# Patient Record
Sex: FEMALE | Employment: UNEMPLOYED | URBAN - METROPOLITAN AREA
[De-identification: names, ages, dates, MRNs, and addresses within clinical notes are randomized per-mention and may not be internally consistent; named-entity substitution may affect disease eponyms.]

---

## 2018-07-20 ENCOUNTER — TELEPHONE (OUTPATIENT)
Dept: FAMILY MEDICINE CLINIC | Facility: CLINIC | Age: 33
End: 2018-07-20

## 2018-07-20 NOTE — TELEPHONE ENCOUNTER
Spoke with mother, pt presently sleeping, pt woke early this morning and vomited x1. Pt is urinating. No loose stools. No temperature taken though pt \"looked flush\". Advised to evaluate upon pt waking up.  They do have a thermometer and mother will take

## 2018-07-20 NOTE — TELEPHONE ENCOUNTER
Patients mom called - pts is wheelchair bound - has been vomiting on/off for a week. Thought she was getting better and it started again. Was given pepto and she has not kept that down.   She had called earlier and was told we had no appts available - may

## 2018-11-26 ENCOUNTER — LAB ENCOUNTER (OUTPATIENT)
Dept: LAB | Age: 33
End: 2018-11-26
Attending: FAMILY MEDICINE
Payer: COMMERCIAL

## 2018-11-26 ENCOUNTER — OFFICE VISIT (OUTPATIENT)
Dept: FAMILY MEDICINE CLINIC | Facility: CLINIC | Age: 33
End: 2018-11-26
Payer: COMMERCIAL

## 2018-11-26 VITALS
SYSTOLIC BLOOD PRESSURE: 114 MMHG | DIASTOLIC BLOOD PRESSURE: 82 MMHG | TEMPERATURE: 98 F | WEIGHT: 146 LBS | BODY MASS INDEX: 28 KG/M2 | HEART RATE: 88 BPM | OXYGEN SATURATION: 98 % | RESPIRATION RATE: 16 BRPM

## 2018-11-26 DIAGNOSIS — Z00.00 LABORATORY EXAM ORDERED AS PART OF ROUTINE GENERAL MEDICAL EXAMINATION: Primary | ICD-10-CM

## 2018-11-26 DIAGNOSIS — E55.9 HYPOVITAMINOSIS D: ICD-10-CM

## 2018-11-26 DIAGNOSIS — Z00.00 LABORATORY EXAM ORDERED AS PART OF ROUTINE GENERAL MEDICAL EXAMINATION: ICD-10-CM

## 2018-11-26 PROCEDURE — 36415 COLL VENOUS BLD VENIPUNCTURE: CPT | Performed by: FAMILY MEDICINE

## 2018-11-26 PROCEDURE — 82306 VITAMIN D 25 HYDROXY: CPT | Performed by: FAMILY MEDICINE

## 2018-11-26 PROCEDURE — 90471 IMMUNIZATION ADMIN: CPT | Performed by: FAMILY MEDICINE

## 2018-11-26 PROCEDURE — 80050 GENERAL HEALTH PANEL: CPT | Performed by: FAMILY MEDICINE

## 2018-11-26 PROCEDURE — 90670 PCV13 VACCINE IM: CPT | Performed by: FAMILY MEDICINE

## 2018-11-26 PROCEDURE — 99213 OFFICE O/P EST LOW 20 MIN: CPT | Performed by: FAMILY MEDICINE

## 2018-11-26 PROCEDURE — 80061 LIPID PANEL: CPT | Performed by: FAMILY MEDICINE

## 2018-11-26 RX ORDER — LORATADINE 10 MG/1
10 TABLET ORAL DAILY
COMMUNITY

## 2018-11-26 NOTE — PROGRESS NOTES
Here for a variety of issues most notably those of an apparent dermatographia some. If her skin especially on the posterior trunk is lightly stroked it can cause itchy nuisance type of response with an elevated whelped appearance she has none currently.

## 2019-09-05 ENCOUNTER — OFFICE VISIT (OUTPATIENT)
Dept: FAMILY MEDICINE CLINIC | Facility: CLINIC | Age: 34
End: 2019-09-05
Payer: COMMERCIAL

## 2019-09-05 VITALS
TEMPERATURE: 98 F | RESPIRATION RATE: 18 BRPM | DIASTOLIC BLOOD PRESSURE: 82 MMHG | BODY MASS INDEX: 28 KG/M2 | OXYGEN SATURATION: 96 % | HEIGHT: 61 IN | HEART RATE: 105 BPM | SYSTOLIC BLOOD PRESSURE: 120 MMHG

## 2019-09-05 DIAGNOSIS — R35.0 FREQUENT URINATION: Primary | ICD-10-CM

## 2019-09-05 DIAGNOSIS — H61.22 IMPACTED CERUMEN, LEFT EAR: ICD-10-CM

## 2019-09-05 LAB
APPEARANCE: CLEAR
BILIRUBIN: NEGATIVE
GLUCOSE (URINE DIPSTICK): NEGATIVE MG/DL
KETONES (URINE DIPSTICK): NEGATIVE MG/DL
LEUKOCYTES: NEGATIVE
MULTISTIX LOT#: NORMAL NUMERIC
NITRITE, URINE: NEGATIVE
OCCULT BLOOD: NEGATIVE
PH, URINE: 7 (ref 4.5–8)
PROTEIN (URINE DIPSTICK): NEGATIVE MG/DL
SPECIFIC GRAVITY: 1.02 (ref 1–1.03)
URINE-COLOR: YELLOW

## 2019-09-05 PROCEDURE — 99213 OFFICE O/P EST LOW 20 MIN: CPT | Performed by: PHYSICIAN ASSISTANT

## 2019-09-05 PROCEDURE — 87086 URINE CULTURE/COLONY COUNT: CPT | Performed by: PHYSICIAN ASSISTANT

## 2019-09-05 PROCEDURE — 81003 URINALYSIS AUTO W/O SCOPE: CPT | Performed by: PHYSICIAN ASSISTANT

## 2019-09-05 NOTE — PROGRESS NOTES
HPI:    Patient ID: Marcel Jo is a 36year old female. HPI   Patient presents today c/o increased urinary frequency for the last week. She is urinating as much as every 5 minutes and sometimes unable to make it to the restroom on time.  She denies b wheezes. She has no rales. Abdominal: Soft. Bowel sounds are normal. She exhibits no distension and no mass. There is no hepatosplenomegaly. There is no tenderness. There is no rigidity, no rebound, no guarding and no CVA tenderness. No hernia.    Neurolo

## 2021-06-28 ENCOUNTER — OFFICE VISIT (OUTPATIENT)
Dept: FAMILY MEDICINE CLINIC | Facility: CLINIC | Age: 36
End: 2021-06-28
Payer: COMMERCIAL

## 2021-06-28 VITALS
HEART RATE: 110 BPM | SYSTOLIC BLOOD PRESSURE: 124 MMHG | RESPIRATION RATE: 18 BRPM | OXYGEN SATURATION: 96 % | DIASTOLIC BLOOD PRESSURE: 86 MMHG

## 2021-06-28 DIAGNOSIS — Z13.29 THYROID DISORDER SCREEN: ICD-10-CM

## 2021-06-28 DIAGNOSIS — Z13.0 SCREENING FOR DEFICIENCY ANEMIA: ICD-10-CM

## 2021-06-28 DIAGNOSIS — Z00.00 WELLNESS EXAMINATION: Primary | ICD-10-CM

## 2021-06-28 DIAGNOSIS — Z13.220 LIPID SCREENING: ICD-10-CM

## 2021-06-28 PROCEDURE — 3074F SYST BP LT 130 MM HG: CPT | Performed by: FAMILY MEDICINE

## 2021-06-28 PROCEDURE — 99395 PREV VISIT EST AGE 18-39: CPT | Performed by: FAMILY MEDICINE

## 2021-06-28 PROCEDURE — 3079F DIAST BP 80-89 MM HG: CPT | Performed by: FAMILY MEDICINE

## 2021-06-28 NOTE — PROGRESS NOTES
HPI:     Sal Friday is a 29year old female who presents for an Annual Health Visit. Has been following with Dr. Roro Gifford for around 20 years. Primarily here for meet and greet. Currently has been on a ketogenic diet.  Isn't weighted often as sh History Narrative      Not on file       REVIEW OF SYSTEMS:     Constitutional: negative  Eyes: negative  ENT: negative  Respiratory: negative  Cardiovascular: negative  Gastrointestinal: negative  Integument/Breast: see HPI  One of her heels had some roug PLAN:   Diagnoses and all orders for this visit:    Wellness examination  -     COMP METABOLIC PANEL (14); Future  -     LIPID PANEL; Future  -     TSH W REFLEX TO FREE T4; Future  -     CBC WITH DIFFERENTIAL WITH PLATELET;  Future    Lipid screening  -

## 2021-08-12 ENCOUNTER — LABORATORY ENCOUNTER (OUTPATIENT)
Dept: LAB | Age: 36
End: 2021-08-12
Attending: FAMILY MEDICINE
Payer: COMMERCIAL

## 2021-08-12 DIAGNOSIS — Z13.0 SCREENING FOR DEFICIENCY ANEMIA: ICD-10-CM

## 2021-08-12 DIAGNOSIS — Z13.29 THYROID DISORDER SCREEN: ICD-10-CM

## 2021-08-12 DIAGNOSIS — Z13.220 LIPID SCREENING: ICD-10-CM

## 2021-08-12 DIAGNOSIS — Z00.00 WELLNESS EXAMINATION: ICD-10-CM

## 2021-08-12 LAB
ALBUMIN SERPL-MCNC: 3.9 G/DL (ref 3.4–5)
ALBUMIN/GLOB SERPL: 1.1 {RATIO} (ref 1–2)
ALP LIVER SERPL-CCNC: 59 U/L
ALT SERPL-CCNC: 27 U/L
ANION GAP SERPL CALC-SCNC: 6 MMOL/L (ref 0–18)
AST SERPL-CCNC: 10 U/L (ref 15–37)
BASOPHILS # BLD AUTO: 0.07 X10(3) UL (ref 0–0.2)
BASOPHILS NFR BLD AUTO: 0.9 %
BILIRUB SERPL-MCNC: 0.3 MG/DL (ref 0.1–2)
BUN BLD-MCNC: 14 MG/DL (ref 7–18)
CALCIUM BLD-MCNC: 8.6 MG/DL (ref 8.5–10.1)
CHLORIDE SERPL-SCNC: 108 MMOL/L (ref 98–112)
CHOLEST SMN-MCNC: 247 MG/DL (ref ?–200)
CO2 SERPL-SCNC: 23 MMOL/L (ref 21–32)
CREAT BLD-MCNC: 0.52 MG/DL
EOSINOPHIL # BLD AUTO: 0.78 X10(3) UL (ref 0–0.7)
EOSINOPHIL NFR BLD AUTO: 9.5 %
ERYTHROCYTE [DISTWIDTH] IN BLOOD BY AUTOMATED COUNT: 12.2 %
GLOBULIN PLAS-MCNC: 3.5 G/DL (ref 2.8–4.4)
GLUCOSE BLD-MCNC: 89 MG/DL (ref 70–99)
HCT VFR BLD AUTO: 37 %
HDLC SERPL-MCNC: 62 MG/DL (ref 40–59)
HGB BLD-MCNC: 11.7 G/DL
IMM GRANULOCYTES # BLD AUTO: 0.01 X10(3) UL (ref 0–1)
IMM GRANULOCYTES NFR BLD: 0.1 %
LDLC SERPL CALC-MCNC: 176 MG/DL (ref ?–100)
LYMPHOCYTES # BLD AUTO: 2.43 X10(3) UL (ref 1–4)
LYMPHOCYTES NFR BLD AUTO: 29.6 %
M PROTEIN MFR SERPL ELPH: 7.4 G/DL (ref 6.4–8.2)
MCH RBC QN AUTO: 29.3 PG (ref 26–34)
MCHC RBC AUTO-ENTMCNC: 31.6 G/DL (ref 31–37)
MCV RBC AUTO: 92.5 FL
MONOCYTES # BLD AUTO: 0.73 X10(3) UL (ref 0.1–1)
MONOCYTES NFR BLD AUTO: 8.9 %
NEUTROPHILS # BLD AUTO: 4.19 X10 (3) UL (ref 1.5–7.7)
NEUTROPHILS # BLD AUTO: 4.19 X10(3) UL (ref 1.5–7.7)
NEUTROPHILS NFR BLD AUTO: 51 %
NONHDLC SERPL-MCNC: 185 MG/DL (ref ?–130)
OSMOLALITY SERPL CALC.SUM OF ELEC: 284 MOSM/KG (ref 275–295)
PATIENT FASTING Y/N/NP: YES
PATIENT FASTING Y/N/NP: YES
PLATELET # BLD AUTO: 348 10(3)UL (ref 150–450)
POTASSIUM SERPL-SCNC: 4 MMOL/L (ref 3.5–5.1)
RBC # BLD AUTO: 4 X10(6)UL
SODIUM SERPL-SCNC: 137 MMOL/L (ref 136–145)
TRIGL SERPL-MCNC: 55 MG/DL (ref 30–149)
TSI SER-ACNC: 2.16 MIU/ML (ref 0.36–3.74)
VLDLC SERPL CALC-MCNC: 11 MG/DL (ref 0–30)
WBC # BLD AUTO: 8.2 X10(3) UL (ref 4–11)

## 2021-08-12 PROCEDURE — 80061 LIPID PANEL: CPT | Performed by: FAMILY MEDICINE

## 2021-08-12 PROCEDURE — 80050 GENERAL HEALTH PANEL: CPT | Performed by: FAMILY MEDICINE

## 2022-09-16 ENCOUNTER — IMMUNIZATION (OUTPATIENT)
Dept: LAB | Age: 37
End: 2022-09-16
Attending: EMERGENCY MEDICINE
Payer: COMMERCIAL

## 2022-09-16 DIAGNOSIS — Z23 NEED FOR VACCINATION: Primary | ICD-10-CM

## 2022-09-16 PROCEDURE — 0134A SARSCOV2 VAC BVL 50MCG/0.5ML: CPT

## 2023-06-13 ENCOUNTER — TELEPHONE (OUTPATIENT)
Dept: FAMILY MEDICINE CLINIC | Facility: CLINIC | Age: 38
End: 2023-06-13

## 2023-06-13 NOTE — TELEPHONE ENCOUNTER
RECEIVED:  6/12/23    SENT TO SCAN STAT:  6/13/23    FAX TO :  [phone]    ALL RECORDS FROM 1/1/04 TO PRESENT      RESPOND BY 6/22/23

## 2023-12-24 ENCOUNTER — HOSPITAL ENCOUNTER (EMERGENCY)
Age: 38
Discharge: HOME | End: 2023-12-24
Attending: EMERGENCY MEDICINE
Payer: COMMERCIAL

## 2023-12-24 ENCOUNTER — APPOINTMENT (OUTPATIENT)
Dept: GENERAL RADIOLOGY | Age: 38
End: 2023-12-24
Attending: EMERGENCY MEDICINE
Payer: COMMERCIAL

## 2023-12-24 VITALS
RESPIRATION RATE: 20 BRPM | HEART RATE: 104 BPM | WEIGHT: 120 LBS | OXYGEN SATURATION: 98 % | DIASTOLIC BLOOD PRESSURE: 87 MMHG | BODY MASS INDEX: 22.66 KG/M2 | SYSTOLIC BLOOD PRESSURE: 126 MMHG | TEMPERATURE: 98 F | HEIGHT: 61 IN

## 2023-12-24 DIAGNOSIS — R00.0 SINUS TACHYCARDIA: ICD-10-CM

## 2023-12-24 DIAGNOSIS — R05.2 SUBACUTE COUGH: ICD-10-CM

## 2023-12-24 DIAGNOSIS — R00.2 PALPITATIONS: ICD-10-CM

## 2023-12-24 LAB
ALBUMIN SERPL-MCNC: 3.9 G/DL (ref 3.4–5)
ALBUMIN/GLOB SERPL: 1 {RATIO} (ref 1–2)
ALP LIVER SERPL-CCNC: 59 U/L
ALT SERPL-CCNC: 23 U/L
ANION GAP SERPL CALC-SCNC: 8 MMOL/L (ref 0–18)
AST SERPL-CCNC: 9 U/L (ref 15–37)
BASOPHILS # BLD AUTO: 0.07 X10(3) UL (ref 0–0.2)
BASOPHILS NFR BLD AUTO: 0.6 %
BILIRUB SERPL-MCNC: 0.3 MG/DL (ref 0.1–2)
BUN BLD-MCNC: 17 MG/DL (ref 9–23)
CALCIUM BLD-MCNC: 9.2 MG/DL (ref 8.5–10.1)
CHLORIDE SERPL-SCNC: 108 MMOL/L (ref 98–112)
CO2 SERPL-SCNC: 20 MMOL/L (ref 21–32)
CREAT BLD-MCNC: 0.53 MG/DL
D DIMER PPP FEU-MCNC: 0.46 UG/ML FEU (ref ?–0.5)
EGFRCR SERPLBLD CKD-EPI 2021: 122 ML/MIN/1.73M2 (ref 60–?)
EOSINOPHIL # BLD AUTO: 0.42 X10(3) UL (ref 0–0.7)
EOSINOPHIL NFR BLD AUTO: 3.5 %
ERYTHROCYTE [DISTWIDTH] IN BLOOD BY AUTOMATED COUNT: 12.8 %
GLOBULIN PLAS-MCNC: 4.1 G/DL (ref 2.8–4.4)
GLUCOSE BLD-MCNC: 105 MG/DL (ref 70–99)
HCT VFR BLD AUTO: 40.4 %
HGB BLD-MCNC: 13.3 G/DL
IMM GRANULOCYTES # BLD AUTO: 0.04 X10(3) UL (ref 0–1)
IMM GRANULOCYTES NFR BLD: 0.3 %
LYMPHOCYTES # BLD AUTO: 2.8 X10(3) UL (ref 1–4)
LYMPHOCYTES NFR BLD AUTO: 23.5 %
MCH RBC QN AUTO: 29.6 PG (ref 26–34)
MCHC RBC AUTO-ENTMCNC: 32.9 G/DL (ref 31–37)
MCV RBC AUTO: 90 FL
MONOCYTES # BLD AUTO: 1.06 X10(3) UL (ref 0.1–1)
MONOCYTES NFR BLD AUTO: 8.9 %
NEUTROPHILS # BLD AUTO: 7.51 X10 (3) UL (ref 1.5–7.7)
NEUTROPHILS # BLD AUTO: 7.51 X10(3) UL (ref 1.5–7.7)
NEUTROPHILS NFR BLD AUTO: 63.2 %
OSMOLALITY SERPL CALC.SUM OF ELEC: 284 MOSM/KG (ref 275–295)
PLATELET # BLD AUTO: 387 10(3)UL (ref 150–450)
POCT INFLUENZA A: NEGATIVE
POCT INFLUENZA B: NEGATIVE
POTASSIUM SERPL-SCNC: 3.7 MMOL/L (ref 3.5–5.1)
PROT SERPL-MCNC: 8 G/DL (ref 6.4–8.2)
RBC # BLD AUTO: 4.49 X10(6)UL
SARS-COV-2 RNA RESP QL NAA+PROBE: NOT DETECTED
SODIUM SERPL-SCNC: 136 MMOL/L (ref 136–145)
TROPONIN I SERPL HS-MCNC: 3 NG/L
WBC # BLD AUTO: 11.9 X10(3) UL (ref 4–11)

## 2023-12-24 PROCEDURE — 71045 X-RAY EXAM CHEST 1 VIEW: CPT | Performed by: EMERGENCY MEDICINE

## 2023-12-24 PROCEDURE — 99285 EMERGENCY DEPT VISIT HI MDM: CPT

## 2023-12-24 PROCEDURE — 87502 INFLUENZA DNA AMP PROBE: CPT | Performed by: EMERGENCY MEDICINE

## 2023-12-24 PROCEDURE — 85025 COMPLETE CBC W/AUTO DIFF WBC: CPT | Performed by: EMERGENCY MEDICINE

## 2023-12-24 PROCEDURE — 80053 COMPREHEN METABOLIC PANEL: CPT | Performed by: EMERGENCY MEDICINE

## 2023-12-24 PROCEDURE — 96360 HYDRATION IV INFUSION INIT: CPT

## 2023-12-24 PROCEDURE — 99284 EMERGENCY DEPT VISIT MOD MDM: CPT

## 2023-12-24 PROCEDURE — 85379 FIBRIN DEGRADATION QUANT: CPT | Performed by: EMERGENCY MEDICINE

## 2023-12-24 PROCEDURE — 84484 ASSAY OF TROPONIN QUANT: CPT | Performed by: EMERGENCY MEDICINE

## 2023-12-24 PROCEDURE — 93005 ELECTROCARDIOGRAM TRACING: CPT

## 2023-12-24 PROCEDURE — 93010 ELECTROCARDIOGRAM REPORT: CPT

## 2023-12-24 RX ORDER — BENZONATATE 100 MG/1
100 CAPSULE ORAL 3 TIMES DAILY PRN
Qty: 30 CAPSULE | Refills: 0 | Status: SHIPPED | OUTPATIENT
Start: 2023-12-24 | End: 2024-01-02

## 2023-12-26 LAB
ATRIAL RATE: 118 BPM
P AXIS: 40 DEGREES
P-R INTERVAL: 112 MS
Q-T INTERVAL: 310 MS
QRS DURATION: 68 MS
QTC CALCULATION (BEZET): 434 MS
R AXIS: 67 DEGREES
T AXIS: 68 DEGREES
VENTRICULAR RATE: 118 BPM

## 2024-01-02 ENCOUNTER — TELEPHONE (OUTPATIENT)
Dept: FAMILY MEDICINE CLINIC | Facility: CLINIC | Age: 39
End: 2024-01-02

## 2024-01-02 RX ORDER — BENZONATATE 100 MG/1
100 CAPSULE ORAL 3 TIMES DAILY PRN
Qty: 30 CAPSULE | Refills: 0 | Status: SHIPPED | OUTPATIENT
Start: 2024-01-02 | End: 2024-02-01

## 2024-01-02 NOTE — TELEPHONE ENCOUNTER
benzonatate 100 MG Oral Cap   [id number] - [place], [place] - [facility] [facility] [address], [phone], [phone]         Given in the ER on 12-    Please let patient know if we are able to refill.

## 2024-01-10 ENCOUNTER — OFFICE VISIT (OUTPATIENT)
Dept: FAMILY MEDICINE CLINIC | Facility: CLINIC | Age: 39
End: 2024-01-10
Payer: COMMERCIAL

## 2024-01-10 VITALS
BODY MASS INDEX: 22.66 KG/M2 | RESPIRATION RATE: 20 BRPM | HEIGHT: 61 IN | OXYGEN SATURATION: 98 % | HEART RATE: 102 BPM | WEIGHT: 120 LBS | SYSTOLIC BLOOD PRESSURE: 118 MMHG | DIASTOLIC BLOOD PRESSURE: 70 MMHG

## 2024-01-10 DIAGNOSIS — R05.2 SUBACUTE COUGH: ICD-10-CM

## 2024-01-10 DIAGNOSIS — R25.3 TWITCHING: Primary | ICD-10-CM

## 2024-01-10 PROCEDURE — 3078F DIAST BP <80 MM HG: CPT | Performed by: FAMILY MEDICINE

## 2024-01-10 PROCEDURE — 99213 OFFICE O/P EST LOW 20 MIN: CPT | Performed by: FAMILY MEDICINE

## 2024-01-10 PROCEDURE — 3008F BODY MASS INDEX DOCD: CPT | Performed by: FAMILY MEDICINE

## 2024-01-10 PROCEDURE — 3074F SYST BP LT 130 MM HG: CPT | Performed by: FAMILY MEDICINE

## 2024-01-10 NOTE — PROGRESS NOTES
[facility] Progress Note    SUBJECTIVE: Jaye Jones 38 year old female is here today for   Chief Complaint   Patient presents with    ER F/U     Room ER f/u        Jaye went to the ER on lila candy, heart rate was high.    Her dad is here and gives some history as well.    She was coughing more than normal and to the point of vomiting prior to that appointment. Don't believe is due to GI issue, as no issues with tolerating food.    After almost a week, ongoing, mom wanted to go get seen. Seemed like she was doing ok with antihistamines and OTC antitussives.    Also started to have twitching in her jaw, and her legs.    Has CP, and spasticity, so they were uncertain how substantial that was.    Looked up and saw that benadryl can cause a fasciculation syndrome, so stopped this and robitussin DM,     Went to bed, and tried tylenol 3, for cough. Then notes heart beating fast, and pulse ox has 137 bpm.    This led to them going into [facility].    Heart has felt fine since then, still have some twitching, feels like jaw will move up and down.    Is on a ketogenic diet.    Not having paroxysms of cough that lead to puking, but still present.        PMH  Past Medical History:   Diagnosis Date    Hordeolum internum     Impacted cerumen     Syncope and collapse         PSH  Past Surgical History:   Procedure Laterality Date    ANESTH,HIP JOINT SURGERY  1/1/90    bilateral metal plates    OTHER  1/1/90    femoral osteoplasty lengthening    OTHER SURGICAL HISTORY      lengthening of hamstring tendon multiple, both legs    OTHER SURGICAL HISTORY  1/1/88    rhizotomy        Social Hx:  No changes    ROS  See HPI    OBJECTIVE:  /70   Pulse 102   Resp 20   Ht 5' 1\" (1.549 m)   Wt 120 lb (54.4 kg)   SpO2 98%   BMI 22.67 kg/m²       CV: RRR, s1 and s2 present, no murmurs clicks or rubs  Resp: clear to auscultation bilaterally        Labs:          Meds:   Current Outpatient Medications    Medication Sig Dispense Refill    benzonatate 100 MG Oral Cap Take 1 capsule (100 mg total) by mouth 3 (three) times daily as needed for cough. 30 capsule 0    loratadine 10 MG Oral Tab Take 1 tablet (10 mg total) by mouth daily.           Assessment/Plan  Jaye was seen today for er f/u.    Diagnoses and all orders for this visit:    Twitching    Subacute cough         For now ok to watch and wait as symptoms have been improving. If return of palpitations can order cardiac monitor. If twitching worsens or not resolving consider recheck labs, and possibly neurology evaluation.         Total Time spent with patient and coordinating care:  20 minutes.    Follow up: as needed for well care.      Eddie Chang MD

## 2024-01-15 RX ORDER — BENZONATATE 100 MG/1
100 CAPSULE ORAL 3 TIMES DAILY PRN
Qty: 30 CAPSULE | Refills: 0 | Status: CANCELLED | OUTPATIENT
Start: 2024-01-15 | End: 2024-02-14

## 2024-01-15 RX ORDER — BENZONATATE 100 MG/1
100 CAPSULE ORAL 3 TIMES DAILY PRN
Qty: 30 CAPSULE | Refills: 0 | OUTPATIENT
Start: 2024-01-15 | End: 2024-02-14

## 2024-01-15 RX ORDER — BENZONATATE 100 MG/1
100 CAPSULE ORAL 3 TIMES DAILY PRN
Qty: 30 CAPSULE | Refills: 0 | Status: SHIPPED | OUTPATIENT
Start: 2024-01-15 | End: 2024-02-14

## 2024-01-15 NOTE — TELEPHONE ENCOUNTER
I don't know, typically when I deny something I'll type a reason in, I don't remember the order coming in and me canceling.  Was there a duplicate and somehow both got rejected? Filled.

## 2024-01-26 RX ORDER — BENZONATATE 100 MG/1
100 CAPSULE ORAL 3 TIMES DAILY PRN
Qty: 30 CAPSULE | Refills: 0 | Status: SHIPPED | OUTPATIENT
Start: 2024-01-26 | End: 2024-02-25

## 2024-01-26 NOTE — TELEPHONE ENCOUNTER
A refill request was received for:  Requested Prescriptions     Pending Prescriptions Disp Refills    benzonatate 100 MG Oral Cap 30 capsule 0     Sig: Take 1 capsule (100 mg total) by mouth 3 (three) times daily as needed for cough.       Last refill date:1/15/2024       Last office visit:1/10/2024    Follow up due:  Future Appointments   Date Time Provider Department Center   2/9/2024 12:10 PM Joselito Alfaro MD [facility]

## 2024-02-05 RX ORDER — BENZONATATE 100 MG/1
100 CAPSULE ORAL 3 TIMES DAILY PRN
Qty: 30 CAPSULE | Refills: 0 | Status: SHIPPED | OUTPATIENT
Start: 2024-02-05 | End: 2024-03-06

## 2024-02-05 NOTE — TELEPHONE ENCOUNTER
.A refill request was received for:  Requested Prescriptions     Pending Prescriptions Disp Refills    benzonatate 100 MG Oral Cap 30 capsule 0     Sig: Take 1 capsule (100 mg total) by mouth 3 (three) times daily as needed for cough.       Last refill date:   1/10/2024    Last office visit: 1/10/2024    Follow up due:  Future Appointments   Date Time Provider Department Center   2/9/2024 12:10 PM Joselito Alfaro MD [facility]

## 2024-02-09 ENCOUNTER — OFFICE VISIT (OUTPATIENT)
Dept: NEUROLOGY | Facility: CLINIC | Age: 39
End: 2024-02-09
Payer: COMMERCIAL

## 2024-02-09 VITALS
BODY MASS INDEX: 23 KG/M2 | DIASTOLIC BLOOD PRESSURE: 72 MMHG | WEIGHT: 120 LBS | HEART RATE: 115 BPM | SYSTOLIC BLOOD PRESSURE: 128 MMHG | RESPIRATION RATE: 16 BRPM

## 2024-02-09 DIAGNOSIS — M26.609 TMJ DYSFUNCTION: ICD-10-CM

## 2024-02-09 DIAGNOSIS — G51.39 FACIAL SPASM: ICD-10-CM

## 2024-02-09 DIAGNOSIS — G82.50 SPASTIC QUADRIPLEGIA (HCC): ICD-10-CM

## 2024-02-09 DIAGNOSIS — Z86.69 HISTORY OF CEREBRAL PALSY: ICD-10-CM

## 2024-02-09 PROCEDURE — 3078F DIAST BP <80 MM HG: CPT | Performed by: OTHER

## 2024-02-09 PROCEDURE — 3074F SYST BP LT 130 MM HG: CPT | Performed by: OTHER

## 2024-02-09 PROCEDURE — 99204 OFFICE O/P NEW MOD 45 MIN: CPT | Performed by: OTHER

## 2024-02-09 RX ORDER — POTASSIUM CHLORIDE 1.5 G/1.58G
20 POWDER, FOR SOLUTION ORAL 2 TIMES DAILY
COMMUNITY

## 2024-02-09 RX ORDER — BACLOFEN 5 MG/1
5 TABLET ORAL 2 TIMES DAILY
Qty: 60 TABLET | Refills: 2 | Status: SHIPPED | OUTPATIENT
Start: 2024-02-09

## 2024-02-09 NOTE — PATIENT INSTRUCTIONS
Take Baclofen 5 mg at night and take extra 5 mg in the morning  No benadryl         2. Left TMJ dysfunction for Physical therapy      3.  Follow with Dentist TMJ specialist            Refill policies:    Allow 2-3 business days for refills; controlled substances may take longer.  Contact your pharmacy at least 5 days prior to running out of medication and have them send an electronic request or submit request through the “request refill” option in your [employer] account.  Refills are not addressed on weekends; covering physicians do not authorize routine medications on weekends.  No narcotics or controlled substances are refilled after noon on Fridays or by on call physicians.  By law, narcotics must be electronically prescribed.  A 30 day supply with no refills is the maximum allowed.  If your prescription is due for a refill, you may be due for a follow up appointment.  To best provide you care, patients receiving routine medications need to be seen at least once a year.  Patients receiving narcotic/controlled substance medications need to be seen at least once every 3 months.  In the event that your preferred pharmacy does not have the requested medication in stock (e.g. Backordered), it is your responsibility to find another pharmacy that has the requested medication available.  We will gladly send a new prescription to that pharmacy at your request.    Scheduling Tests:    If your physician has ordered radiology tests such as MRI or CT scans, please contact Central Scheduling at [phone] right away to schedule the test.  Once scheduled, the [facility] Centralized Referral Team will work with your insurance carrier to obtain pre-certification or prior authorization.  Depending on your insurance carrier, approval may take 3-10 days.  It is highly recommended patients assure they have received an authorization before having a test performed.  If test is done without insurance authorization, patient may be responsible  for the entire amount billed.      Precertification and Prior Authorizations:  If your physician has recommended that you have a procedure or additional testing performed the [facility] Centralized Referral Team will contact your insurance carrier to obtain pre-certification or prior authorization.    You are strongly encouraged to contact your insurance carrier to verify that your procedure/test has been approved and is a COVERED benefit.  Although the [facility] Centralized Referral Team does its due diligence, the insurance carrier gives the disclaimer that \"Although the procedure is authorized, this does not guarantee payment.\"    Ultimately the patient is responsible for payment.   Thank you for your understanding in this matter.  Paperwork Completion:  If you require FMLA or disability paperwork for your recovery, please make sure to either drop it off or have it faxed to our office at [phone]. Be sure the form has your name and date of birth on it.  The form will be faxed to our Forms Department and they will complete it for you.  There is a 25$ fee for all forms that need to be filled out.  Please be aware there is a 10-14 day turnaround time.  You will need to sign a release of information (NISSA) form if your paperwork does not come with one.  You may call the Forms Department with any questions at [phone].  Their fax number is [phone].

## 2024-02-09 NOTE — PROGRESS NOTES
HPI:    Patient ID: Jaye Jones is a 39 year old female.  PCP: Dr Bernardo ORELLANA  Jaye Jones is a 39 year old female with history of cerebral palsy and spastic quadriplegia who presents for evaluation of jaw/facial twitching. She is here along with her father who reports that Jaye had URI around [place] and since then have chronic cough and during coughing jaw twitches and also she has noticed some right facial spasm for a few seconds. No facial or jaw pain  She has baseline facial asymmetry and drooling.      Father reports she was born full term by C section and shortly after birth aspirated and coded, placed on ventilator and had seizures. She was treated with seizure medication until 2 year of age and remains seizure free that. She was spastic quadriplegia right side more weak than left. She is able to speak and feeds herself. She follows with Physiatry at [facility].        HISTORY:  Past Medical History:   Diagnosis Date    Hordeolum internum     Impacted cerumen     Syncope and collapse       Past Surgical History:   Procedure Laterality Date    ANESTH,HIP JOINT SURGERY  1/1/90    bilateral metal plates    OTHER  1/1/90    femoral osteoplasty lengthening    OTHER SURGICAL HISTORY      lengthening of hamstring tendon multiple, both legs    OTHER SURGICAL HISTORY  1/1/88    rhizotomy      Family History   Problem Relation Age of Onset    Other (SIDS [Other]) Sister       Social History     Socioeconomic History    Marital status: Single   Tobacco Use    Smoking status: Never     Passive exposure: Never    Smokeless tobacco: Never   Vaping Use    Vaping Use: Never used   Substance and Sexual Activity    Alcohol use: No    Drug use: No   Other Topics Concern    Caffeine Concern Yes    Exercise Yes        Review of Systems   Constitutional: Negative.    HENT:  Positive for drooling.    Eyes: Negative.    Respiratory: Negative.     Cardiovascular: Negative.    Gastrointestinal: Negative.    Endocrine:  Negative.    Genitourinary: Negative.    Musculoskeletal:  Positive for gait problem. Negative for neck stiffness.   Skin: Negative.    Allergic/Immunologic: Negative.    Neurological:  Positive for facial asymmetry and speech difficulty.        Spastic quadriplegia   Hematological: Negative.    Psychiatric/Behavioral: Negative.     All other systems reviewed and are negative.           Current Outpatient Medications   Medication Sig Dispense Refill    potassium chloride 20 MEQ Oral Powd Pack Take 20 mEq by mouth 2 (two) times daily. Dad states a spoon full      benzonatate 100 MG Oral Cap Take 1 capsule (100 mg total) by mouth 3 (three) times daily as needed for cough. 30 capsule 0    loratadine 10 MG Oral Tab Take 1 tablet (10 mg total) by mouth daily.       Allergies:No Known Allergies  PHYSICAL EXAM:   Physical Exam  Blood pressure 128/72, pulse 115, resp. rate 16, weight 120 lb (54.4 kg).      General Appearance: Sitting in wheel chair, no apparent distress   HEENT: Normocephalic and atraumatic.   Neck: slight right torticollis  Cardiovascular: Normal rate, regular rhythm and normal heart sounds.    Pulmonary/Chest: Effort normal and breath sounds normal.   Abdominal: Soft. Bowel sounds are normal.   Psych: normal mood and affect    Neurological:  Patient is awake, alert and oriented to person, place and time . Speech is fluent but very dysarthric, able to follows simple commands      Cranial Nerves:   II: Visual acuity: normal  III: Pupils: equal, round, reactive to light  III,IV,VI: mild dysconjugate gaze  V: Facial sensation: intact  VII: Facial strength: facial asymmetry at baseline  VIII: Hearing: intact  IX: Palate: intact  XI: Shoulder shrug: intact  XII: Tongue movement: normal    Motor : Spastic quadriplegia right>left, contracted right hand     Sensory: Sensory examination is normal to light touch and pinprick     Coordination: impaired.    Gait: wheel chair bound        ASSESSMENT/PLAN:        ICD-10-CM    1. TMJ dysfunction  M26.609 Referral to Physical Therapy and Rehab      2. Facial spasm  G51.39       3. Spastic quadriplegia (HCC)  G82.50         Facial/jaw twitching spasm, likely related to cerebral palsy and TMJ dysfunction. Twitching triggered by cough, had recent URI.   CP causes poor facial muscle control and TMJ disorder.    Refer to Physical therapy for TMJ therapy  Start Baclofen 5 mg BID for spasticity  Patient follows with Physiatry    Thank you for allowing us to participate in your patient's care.        Joselito Alfaro MD  [facility]      Meds This Visit:  Requested Prescriptions      No prescriptions requested or ordered in this encounter       Imaging & Referrals:  None     ID#[id number]

## 2024-02-17 RX ORDER — BENZONATATE 100 MG/1
100 CAPSULE ORAL 3 TIMES DAILY PRN
Qty: 30 CAPSULE | Refills: 0 | Status: SHIPPED | OUTPATIENT
Start: 2024-02-17 | End: 2024-03-18

## 2024-02-17 NOTE — TELEPHONE ENCOUNTER
A refill request was received for:  Requested Prescriptions     Pending Prescriptions Disp Refills    benzonatate 100 MG Oral Cap 30 capsule 0     Sig: Take 1 capsule (100 mg total) by mouth 3 (three) times daily as needed for cough.       Last refill date:2/5/2024       Last office visit:1/10/2024     Follow up due:  Future Appointments   Date Time Provider Department Center   2/28/2024  9:15 AM Dona Borja, PT [facility]   3/11/2024  5:00 PM Dona Borja, PT [facility]   3/13/2024  5:00 PM Dona Borja, PT [facility]   3/18/2024  5:00 PM Dona Borja, PT [facility]   3/20/2024  5:00 PM Dona Borja, PT [facility]   3/25/2024  5:45 PM Dona Borja, PT [facility]   3/27/2024  5:00 PM Dona Borja, PT [facility]   5/10/2024  1:10 PM Joselito Alfaro MD [facility]

## 2024-02-26 ENCOUNTER — TELEPHONE (OUTPATIENT)
Dept: PHYSICAL THERAPY | Facility: HOSPITAL | Age: 39
End: 2024-02-26

## 2024-02-27 ENCOUNTER — TELEPHONE (OUTPATIENT)
Dept: NEUROLOGY | Facility: CLINIC | Age: 39
End: 2024-02-27

## 2024-02-27 DIAGNOSIS — G82.50 SPASTIC QUADRIPLEGIA (HCC): Primary | ICD-10-CM

## 2024-02-27 DIAGNOSIS — Z86.69 HISTORY OF CEREBRAL PALSY: ICD-10-CM

## 2024-02-27 NOTE — TELEPHONE ENCOUNTER
Pt states she is feeling tight. She is requesting her PT order be updated to include working on her arms. Please advise, Pt's best call back number is [phone]. Endorsed to RN for Provider.

## 2024-02-28 ENCOUNTER — TELEPHONE (OUTPATIENT)
Dept: PHYSICAL THERAPY | Facility: HOSPITAL | Age: 39
End: 2024-02-28

## 2024-02-28 ENCOUNTER — OFFICE VISIT (OUTPATIENT)
Dept: PHYSICAL THERAPY | Age: 39
End: 2024-02-28
Attending: Other
Payer: COMMERCIAL

## 2024-02-28 DIAGNOSIS — M26.609 TMJ DYSFUNCTION: Primary | ICD-10-CM

## 2024-02-28 PROCEDURE — 97110 THERAPEUTIC EXERCISES: CPT

## 2024-02-28 PROCEDURE — 97162 PT EVAL MOD COMPLEX 30 MIN: CPT

## 2024-02-28 NOTE — PROGRESS NOTES
TEMPORAL MANDIBULAR JOINT EVALUATION:   Referring Physician: Dr. Alfaro  Diagnosis: Spastic quadriplegia (HCC) (G82.50)  History of cerebral palsy (Z86.69)     Date of Service: 2/28/2024  Precautions: CP       PATIENT SUMMARY   Jaye Jones is a 39 year old y/o female who presents to therapy today with complaints of spasms in her jaw since November 2023. Spasms in LUE since last year.  She had bad bronchitis (severe coughing episodes). She continues to have spasms even as illness has resolved. She does not have these spasms at baseline. She was recommended to go to PT, dentistry, and physiatry. She has tightness more-so in her L arm. These spasms are an additional chief complaint. She is clear that she does not have any pain. She was given baclofen 5mg 2 weeks ago but does not notice any changes in symptoms yet. She had consult with dentist. She has appt with physiatry tomorrow. She has had PT in the past for UE/LE symptoms- she does not continue her HEP. She has been looking online for exercise options.     Jaw spasm: chief complaint 1: chin is moving up and down. She has what she describes as tightness anterior chin about 2-3 inch span. She first started noticing the spasms a few week after thanksgiving. Frequency: random times throughout the day- 20-30 times per day. Sometimes even more than this. Duration: 1-2 sec. They go away on their own. Aggravating activities include coughing- sometimes it happens when she doesn't cough too.   Relieving activities include does not have to do anything to make it go away. Pt describes spams: 24 hour pattern: more likely to happen first thing in the morning.  She does not get HA. Pt reports current condition is same over time. Other treatments included never for TMJ. Baseline: has popping and cracking in jaw bilaterally upon opening- she states this does not bother her and causes no pain. Sleeping: limited more by the spams in the L arm. She does wake up with spasms in  jaw sometimes.   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 When the weather is warmer she will go for walks in her power w/c or to the library.     Red Flag Qs: Pt currently denying the following:  Dizzy, no trouble swallowing, no HA.   Special Qs:  L handed. Pt currently denying pain or issues with the following: cough, sneeze, clicking, popping, locking, catching, giving way.     Upper extremity spasms: chief complaint: 2: on the L, triggers include typing, sleeping and sometimes when doing nothing. She states Baclofen is not helping yet. She states there are days when she feels loose and days when she feels tightness. Spasms are back into D2 extension position typically. They are happening many times per day >20. They last a few moments. She will try stretching her arm on the table but this does not always help relieve the spasm.     Physician Appointment:Physiatrist tomorrow.      Pt describes pain not chief complaint.   Current functional limitations include resting/sleeping without interruption, typing without interruption.   Equipment Currently Using: Other power wheelchair, no dental devices    Neck Disability Index Score  Score: 34 % (2/14/2024 12:31 AM)      Jaye describes prior level of function no spasms in jaw prior to 2023 November. Pt goals include to reduce spasms in LUE and jaw.  Past medical history was reviewed with Jaye. Significant findings include  has a past medical history of Hordeolum internum, Impacted cerumen, and Syncope and collapse.     ASSESSMENT  Jaye presents to physical therapy evaluation with primary c/o Jaw spasms and tightness, LUE spasms. The results of the objective tests and measures show positive S curve pattern upon opening, positive cotton roll test on the R indicating muscular involvement, tighntess in R SCM compared to L. AROM unremarkable on the L compared to the R. No spasm triggered with MMT of UEs grossly.  Functional deficits include but are not  limited to ability to rest.  Signs and symptoms are consistent with diagnosis of TMJ dysfunction with spasms, Postural abnormalities, LUE spasticity - worsening. Pt and PT discussed evaluation findings, pathology, POC and HEP.  Pt voiced understanding and performs HEP correctly without reported pain. Skilled Physical Therapy is medically necessary to address the above impairments and reach functional goals.     Precautions:   medical hx CP  OBJECTIVE:   Observation/Posture: slight R torticollis   Opening Pattern: \"s\" curve (motor control): +     \"C\" curve (disc issue): -  TMJ AROM:   Open (45 mm):45 mm- *popping bilaterally, *spasm triggered shortly after  Protrusion (6-8 mm): unable  Retrusion (3 mm): unable  Lateral (10-15 mm): R: 5 L:5    Cotton Roll Test: bite on molars to unload joint: if pain increases: muscular: R *spasm; L: unremarkable        If pain decreases: joint or retro discal: no reduction in symptoms    Shoulder AROM:   Flexion 160   Abduction 160;   ER: *triggers spasm in 90/90 ER     AROM:  Cervical Spine   Mobility End Range    Flexion 50                  Extension 50 *repeated motions triggers spasm   R Sidebend 25*requires TCs    L Sidebend 25 *requires TCs    R Rotation 70    L Rotation 70        TMJ Accessory Joint  Mobility  Right   Left    Anterior  Unremarkable*tenderness Unremarkable   Inferior  hypomobile hypomobile   Medial   hypomobile Hypomobile               MMT STRENGTH TESTING:      Left  Right Comments   GH Flexion     4/5   4/5      GH Extension  4/5   4/5      GH Abduction 4-/5   4-/5      GH ER 3+/5   3+/5      GH IR 4-/5   4-/5      *no LUE spasms triggered     Flexibility and Palpation:   Upper Trapezius: R tightness to palpation,  L tightness to palpation   Levator Scap: R tightness, L tightness  Scalenes/SCM: R  Tightness-  slight right torticollis , L WFL  Hyoids: R WFL, L WFL  Masseter: R Tenderness, tightness, L WFL  Temporalis: R WFL, L WFL  Lateral Pterygoid: R  tenderness, L WFL    Today’s Treatment and Response:   Pt education was provided on exam findings, treatment diagnosis, treatment plan, expectations, and prognosis. Pt was also provided recommendations for see below    Manual: 5 min   STM to R SCM and masseter 5 min   Patient was instructed in and issued a HEP for:   There ex: 10 min   Issued and Handouts Given:    open and close with tongue on roof   trace figure 8 on roof of mouth   self glides   postural exercises  Pt education:    Natural resting position- tongue on roof of mouth   Avoid hard chewy foods   Avoid resting chin on hand- pressure   - phone conversation with dentist recommending conservative trial first due to potential lack of tolerance to any oral devices    Access Code: [email or web address]  URL: [email or web address]  Date: 02/28/2024  Prepared by: Dona Borja    Program Notes  Start with good upright posture then proceed to exercises.     Exercises  - Correct Seated Posture  - 3 x daily - 7 x weekly - 1 reps  - Seated Cervical Retraction  - 3-4 x daily - 7 x weekly - 3 sets - 10 reps  - Seated Cervical Rotation AROM  - 1 x daily - 7 x weekly - 2 sets - 10 reps  - Seated TMJ Opening with Unilateral Facilitation with Finger  - 2 x daily - 7 x weekly - 1 sets - 10 reps    Charges: PT Eval Moderate Complexity, there ex: 1       Total Timed Treatment: 15 min min     Total Treatment Time: 50 min     Based on clinical rationale and outcome measures, this evaluation involved Moderate Complexity decision making due to 1-2 personal factors/comorbidities, 3 body structures involved/activity limitations, and evolving symptoms including changing pain levels.  PLAN OF CARE:    Goals: (to be met in 8 visits)  Pt will demonstrate ability to complete opening to 50cm with no subjective tightness in order to maximize eating and chewing capacity.   Pt will demonstrate ability to bite down on the R side without spasm or pain.   Pt will demonstrate  appropriate upper cervical posture without cues in order to maximize TMJ function and alignment.   Pt will be indep with HEP.     Frequency / Duration: Patient will be seen for 1-2 x/week or a total of 8 visits over a 90 day period. Treatment will include: Manual Therapy, Mechanical Traction, Neuromuscular Re-education, Self-Care Home Management, Therapeutic Activities, Therapeutic Exercise, Home Exercise Program instruction, and Modalities to include: Electrical stimulation (unattended) and Ultrasound    Education or treatment limitation: None  Rehab Potential:good    Patient/Family/Caregiver was advised of these findings, precautions, and treatment options and has agreed to actively participate in planning and for this course of care.    Thank you for your referral. Please co-sign or sign and return this letter via fax as soon as possible to [phone]. If you have any questions, please contact me at Dept: [phone]    Sincerely,  Electronically signed by therapist: Dona Borja, PT  Physician's certification required: Yes  I certify the need for these services furnished under this plan of treatment and while under my care.    X___________________________________________________ Date____________________    Certification From: 2/28/2024  To:5/28/2024

## 2024-02-29 ENCOUNTER — TELEPHONE (OUTPATIENT)
Dept: PHYSICAL THERAPY | Facility: HOSPITAL | Age: 39
End: 2024-02-29

## 2024-03-01 RX ORDER — BENZONATATE 100 MG/1
100 CAPSULE ORAL 3 TIMES DAILY PRN
Qty: 30 CAPSULE | Refills: 0 | Status: SHIPPED | OUTPATIENT
Start: 2024-03-01 | End: 2024-03-31

## 2024-03-01 NOTE — TELEPHONE ENCOUNTER
A refill request was received for:  Requested Prescriptions     Pending Prescriptions Disp Refills    benzonatate 100 MG Oral Cap 30 capsule 0     Sig: Take 1 capsule (100 mg total) by mouth 3 (three) times daily as needed for cough.       Last refill date: 2/17/24      Last office visit:1/10/24     Follow up due:  Future Appointments   Date Time Provider Department Center   3/11/2024  5:00 PM Dona Borja, PT SNPT [facility]   3/13/2024  5:00 PM Dona Borja, PT SNPT [facility]   3/18/2024  5:00 PM Dona Borja, PT SNPT [facility]   3/20/2024  5:00 PM Dona Borja, PT SNPT [facility]   3/25/2024  5:45 PM Dona Borja, PT SNPT [facility]   3/27/2024  5:00 PM Dona Borja, PT SNPT [facility]   5/10/2024  1:10 PM Joselito Alfaro MD [facility]

## 2024-03-06 ENCOUNTER — APPOINTMENT (OUTPATIENT)
Dept: PHYSICAL THERAPY | Age: 39
End: 2024-03-06
Attending: Other
Payer: COMMERCIAL

## 2024-03-11 ENCOUNTER — OFFICE VISIT (OUTPATIENT)
Dept: PHYSICAL THERAPY | Age: 39
End: 2024-03-11
Attending: Other
Payer: COMMERCIAL

## 2024-03-11 ENCOUNTER — ORDER TRANSCRIPTION (OUTPATIENT)
Dept: PHYSICAL THERAPY | Facility: HOSPITAL | Age: 39
End: 2024-03-11

## 2024-03-11 DIAGNOSIS — G80.9 CEREBRAL PALSY (HCC): Primary | ICD-10-CM

## 2024-03-11 PROCEDURE — 97140 MANUAL THERAPY 1/> REGIONS: CPT

## 2024-03-11 PROCEDURE — 97110 THERAPEUTIC EXERCISES: CPT

## 2024-03-11 RX ORDER — BENZONATATE 100 MG/1
100 CAPSULE ORAL 3 TIMES DAILY PRN
Qty: 30 CAPSULE | Refills: 0 | Status: SHIPPED | OUTPATIENT
Start: 2024-03-11 | End: 2024-04-10

## 2024-03-11 NOTE — PROGRESS NOTES
Diagnosis:   TMJ dysfunction (M26.609)   Spastic quadriplegia (HCC) (G82.50)  History of cerebral palsy (Z86.69)          Referring Provider: Angelina  Date of Evaluation:    2/28/2024     Precautions:   CP Next MD visit:   none scheduled  Date of Surgery: n/a   Insurance Primary/Secondary: BCBS POS / N/A     # Auth Visits: BCBS no limit            Subjective: Pt states starting OT for LUE. She states episodes are around eating and around bedtime. She is unsure if it is related to eating any particularly chewy meals/foods.   Pain: 0/10- not chief complaint  Chief complaint: TMJ spasms      Objective:   Cotton Roll Test: bite on molars to unload joint: if pain increases: muscular: R unremarkable 3/11/2024 ; L: unremarkable     Cervical AROM: SB: R: 40 deg; L; 15 deg*tightness R side neck   Palpation: no tenderness to palpation, tightness in R SCM and scalenes    Neck Disability Index Score  Score: 34 % (2/14/2024 12:31 AM)    Assessment: Pt has notable restrictions in R SB Passively as well as actively. During session manual intervention was utilized to reduce spasm of TMJ elevators. Tolerated session well. Only 1 episode of spasm noted t/o even with clenching, opening and various functional activities. Postural cues required t/o therapy session for reminder of mechanics/starting position. Pt verbalized understanding. HEP updated.       Goals:   Goals: (to be met in 8 visits)  Pt will demonstrate ability to complete opening to 50cm with no subjective tightness in order to maximize eating and chewing capacity.   Pt will demonstrate ability to bite down on the R side without spasm or pain.   Pt will demonstrate appropriate upper cervical posture without cues in order to maximize TMJ function and alignment.   Pt will be indep with HEP.     Plan: Continue per plan of care. Plan for next therapy session: consider bite exercises for HEP if frequency of spasm unchanged, continue to address R sided tightness   Date:  3/11/2024  TX#: 2/8  Date:                 TX#: 3/ Date:                 TX#: 4/ Date:                 TX#: 5/ Date:   Tx#: 6/   Manual: 15 min   STM to masseter, temporalis, SCM, upper traps, scalenes on the R   PROM L SB cervical spine 10 sec hold 10 reps to tolerance   Lateral glide gr III TMJ 10 reps on the R x 2 sets          There ex:  25 min   Cervical retraction against headrest 10 reps 3 sec hold   Cervical retraction + rotation 10 reps   Cervical L SB 10 reps 2 sec hold   Bite and roll- tube 10 reps x 2 sets- pain free   Controlled opening- difficulty coordinating tongue- partial range 10 reps x 2 sets - intermittent pain free popping noted   Pt education: HEP updates and benefits                      HEP: Access Code: [email or web address]  URL: [email or web address]  Date: 03/11/2024  Prepared by: Dona Borja    Program Notes  Start with good upright posture then proceed to exercises.     Exercises  - Correct Seated Posture  - 3 x daily - 7 x weekly - 1 reps  - Seated Cervical Retraction  - 3-4 x daily - 7 x weekly - 3 sets - 10 reps  - Seated TMJ Opening with Unilateral Facilitation with Finger  - 2 x daily - 7 x weekly - 1 sets - 10 reps  - Standing Cervical Retraction with Sidebending  - 1 x daily - 7 x weekly - 1 sets - 10 reps - 3 hold    Charges: there ex: 2 manual; 1        Total Timed Treatment:  40 min  Total Treatment Time: 40 min

## 2024-03-11 NOTE — TELEPHONE ENCOUNTER
A refill request was received for:  Requested Prescriptions     Pending Prescriptions Disp Refills    benzonatate 100 MG Oral Cap 30 capsule 0     Sig: Take 1 capsule (100 mg total) by mouth 3 (three) times daily as needed for cough.       Last refill date: 3/1/2024      Last office visit:1/10/2024     Follow up due:  Future Appointments   Date Time Provider Department Center   3/11/2024  5:00 PM Dona Borja, PT SNPT [facility]   3/13/2024  5:00 PM Dona Borja, PT SNPT [facility]   3/18/2024  5:00 PM Dona Borja, PT SNPT [facility]   3/20/2024  5:00 PM Dona Borja, PT SNPT [facility]   3/25/2024  5:45 PM Dona Borja, PT SNPT [facility]   3/27/2024  5:00 PM Dona Borja, PT SNPT [facility]   5/10/2024  1:10 PM Joselito Alfaro MD [facility]

## 2024-03-13 ENCOUNTER — TELEPHONE (OUTPATIENT)
Dept: OCCUPATIONAL MEDICINE | Facility: HOSPITAL | Age: 39
End: 2024-03-13

## 2024-03-13 ENCOUNTER — OFFICE VISIT (OUTPATIENT)
Dept: PHYSICAL THERAPY | Age: 39
End: 2024-03-13
Attending: Other
Payer: COMMERCIAL

## 2024-03-13 PROCEDURE — 97110 THERAPEUTIC EXERCISES: CPT

## 2024-03-13 PROCEDURE — 97140 MANUAL THERAPY 1/> REGIONS: CPT

## 2024-03-13 NOTE — PROGRESS NOTES
Diagnosis:   TMJ dysfunction (M26.609)   Spastic quadriplegia (HCC) (G82.50)  History of cerebral palsy (Z86.69)          Referring Provider: Angelina  Date of Evaluation:    2/28/2024     Precautions:   CP Next MD visit:   none scheduled  Date of Surgery: n/a   Insurance Primary/Secondary: BCBS POS / N/A     # Auth Visits: BCBS no limit            Subjective: Pt states spasms since last therapy session consistent.   Pain: 0/10- not chief complaint  Chief complaint: anterior jaw spasms- identifies chin- unable to confirm or deny if jaw motion is present with spasms or if they are just local and muscular in nature    Objective:   Cotton Roll Test: bite on molars to unload joint: if pain increases: muscular:   R unremarkable 3/13/2024  ; L: unremarkable     Isometric TMJ depression: *spasm     Cervical AROM: SB: R: 40 deg; L; 20 deg*tightness R side neck   Palpation: no tenderness to palpation, tightness in R SCM and scalenes    Neck Disability Index Score  Score: 34 % (2/14/2024 12:31 AM)    Assessment: Pt had no symptoms with bite test last 2 therapy session. She did have symptom reproduction with iso metric opening. Additionally, some tightness to palpation in mentalis and depressor labii inferioris. Some facial exercises/stretches were added to HEP as she tolerated them well during therapy session. Answered all questions and concerns to the best of my abilities.     Goals: (to be met in 8 visits) Progressing toward goals 3/13/2024   Pt will demonstrate ability to complete opening to 50cm with no subjective tightness in order to maximize eating and chewing capacity.   Pt will demonstrate ability to bite down on the R side without spasm or pain.   Pt will demonstrate appropriate upper cervical posture without cues in order to maximize TMJ function and alignment.   Pt will be indep with HEP.     Plan: Continue per plan of care. Plan for next therapy session:assess frequency of spasm and tolerance of HEP  Date:  3/11/2024  TX#: 2/8  Date:   3/13/2024               TX#: 3/8  Date:                 TX#: 4/ Date:                 TX#: 5/ Date:   Tx#: 6/   Manual: 15 min   STM to masseter, temporalis, SCM, upper traps, scalenes on the R   PROM L SB cervical spine 10 sec hold 10 reps to tolerance   Lateral glide gr III TMJ 10 reps on the R x 2 sets    Manual: 15 min   STM to masseter, temporalis, SCM, upper traps, scalenes on the R   STM to mentalis, depressor labii inferioris 5 min   PROM L SB cervical spine 10 sec hold 10 reps to tolerance   Lateral glide gr III TMJ 10 reps on the L x 2 sets       There ex:  25 min   Cervical retraction against headrest 10 reps 3 sec hold   Cervical retraction + rotation 10 reps   Cervical L SB 10 reps 2 sec hold   Bite and roll- tube 10 reps x 2 sets- pain free   Controlled opening- difficulty coordinating tongue- partial range 10 reps x 2 sets - intermittent pain free popping noted   Pt education: HEP updates and benefits  There ex:  25 min   Cervical retraction against headrest 10 reps 3 sec hold   Cervical retraction + rotation 10 reps   Cervical L SB 10 reps 2 sec hold   Controlled opening 10 reps   Facial exercises: smile 10 reps; grimace 10 reps; puff cheeks 10 reps   Pt education: HEP updates and benefits                       Access Code: [email or web address]  URL: [email or web address]  Date: 03/13/2024  Prepared by: Dona Borja    Program Notes    Start with good upright posture then proceed to exercises.     Exercises  - Correct Seated Posture  - 3 x daily - 7 x weekly - 1 reps  - Seated Cervical Retraction  - 3-4 x daily - 7 x weekly - 3 sets - 10 reps  - Isometric Jaw Abduction  - 1 x daily - 7 x weekly - 1 sets - 10 reps - 2 sec hold  - Seated TMJ Opening with Unilateral Facilitation with Finger  - 2 x daily - 7 x weekly - 1 sets - 10 reps  - Standing Cervical Retraction with Sidebending  - 1 x daily - 7 x weekly - 1 sets - 10 reps - 3 hold  - Face Grimace  - 1 x daily - 7 x weekly  - 2 sets - 10 reps  - Smile   - 1 x daily - 7 x weekly - 2 sets - 10 reps  - Cheek Puff  - 1 x daily - 7 x weekly - 2 sets - 10 repsHEP:     Charges: there ex: 2 manual; 1        Total Timed Treatment:  40 min  Total Treatment Time: 40 min

## 2024-03-18 ENCOUNTER — OFFICE VISIT (OUTPATIENT)
Dept: PHYSICAL THERAPY | Age: 39
End: 2024-03-18
Attending: Other
Payer: COMMERCIAL

## 2024-03-18 PROCEDURE — 97140 MANUAL THERAPY 1/> REGIONS: CPT

## 2024-03-18 PROCEDURE — 97110 THERAPEUTIC EXERCISES: CPT

## 2024-03-18 NOTE — PROGRESS NOTES
Diagnosis:   TMJ dysfunction (M26.609)   Spastic quadriplegia (HCC) (G82.50)  History of cerebral palsy (Z86.69)          Referring Provider: Angelina  Date of Evaluation:    2/28/2024     Precautions:   CP Next MD visit:   none scheduled  Date of Surgery: n/a   Insurance Primary/Secondary: BCBS POS / N/A     # Auth Visits: BCBS no limit            Subjective: Pt states spasms more yesterday but on therapy treatment day it was fine (less than the previous two days). She does noticed less spasms at home for short time after completing HEP.   Pain: 0/10- not chief complaint  Chief complaint: anterior jaw spasms- identifies chin- unable to confirm or deny if jaw motion is present with spasms or if they are just local and muscular in nature    Objective:   Pursed lips: *spasm triggered (lateral deviation of jaw)- pre treatment    Unremarkable- post treatment     Isometric TMJ depression: unremarkable     Cervical AROM: SB: R: 40 deg; L; 30 deg*tightness R side neck   Cervical extension: WFL *tightness in anterior neck   Palpation: no tenderness to palpation, tightness in R SCM and scalenes    Neck Disability Index Score  Score: 34 % (2/14/2024 12:31 AM)    Assessment: Pt had triggers of her spam (lateral deviations of jaw) with successive repetitions of pursed lip positioning in anterior head position. After neck and TMJ stretches she was able to complete this exercise post treatment without spasm while maintaining proper upper cervical positioning. HEP updated, instructed on timing and use of HEP.     Goals: (to be met in 8 visits) Progressing toward goals 3/18/2024    Pt will demonstrate ability to complete opening to 50cm with no subjective tightness in order to maximize eating and chewing capacity.   Pt will demonstrate ability to bite down on the R side without spasm or pain.   Pt will demonstrate appropriate upper cervical posture without cues in order to maximize TMJ function and alignment.   Pt will be indep  with HEP.     Plan: Continue per plan of care. Plan for next therapy session:assess frequency of spasm and tolerance of HEP  Date: 3/11/2024  TX#: 2/8  Date:   3/13/2024               TX#: 3/8  Date: 3/18/2024                 TX#: 4/8  Date:                 TX#: 5/ Date:   Tx#: 6/   Manual: 15 min   STM to masseter, temporalis, SCM, upper traps, scalenes on the R   PROM L SB cervical spine 10 sec hold 10 reps to tolerance   Lateral glide gr III TMJ 10 reps on the R x 2 sets    Manual: 15 min   STM to masseter, temporalis, SCM, upper traps, scalenes on the R   STM to mentalis, depressor labii inferioris 5 min   PROM L SB cervical spine 10 sec hold 10 reps to tolerance   Lateral glide gr III TMJ 10 reps on the L x 2 sets  Manual: 15 min   STM to masseter, temporalis, SCM, upper traps, scalenes on the R, medial pterygoid  STM to mentalis, depressor labii inferioris 5 min   PROM L SB cervical spine 10 sec hold 10 reps to tolerance        There ex:  25 min   Cervical retraction against headrest 10 reps 3 sec hold   Cervical retraction + rotation 10 reps   Cervical L SB 10 reps 2 sec hold   Bite and roll- tube 10 reps x 2 sets- pain free   Controlled opening- difficulty coordinating tongue- partial range 10 reps x 2 sets - intermittent pain free popping noted   Pt education: HEP updates and benefits  There ex:  25 min   Cervical retraction against headrest 10 reps 3 sec hold   Cervical retraction + rotation 10 reps   Cervical L SB 10 reps 2 sec hold   Controlled opening 10 reps   Facial exercises: smile 10 reps; grimace 10 reps; puff cheeks 10 reps   Pt education: HEP updates and benefits    There ex:  30 min   Cervical retraction against headrest 10 reps 5 sec hold x 2 sets   Cervical retraction + rotation 10 reps   TMJ bite and hold 5 sec x 3 sets - no spasm noted  Cervical L SB 10 reps 2 sec hold - TCs, VCs  opening TMJ 5 reps 5 sec hold x 2 sets   TMJ lateral deviation 5 reps R/L   Facial exercises: smile 10 reps;  grimace 10 reps; puff cheeks 10 reps, pucker *triggers spasm with each repetition- pre treatment; unremarkable post stretches  Pt education: timing and use of exercises- to complete when tension in neck and spasm in jaw                   Access Code: [email or web address]  URL: [email or web address]  Date: 03/18/2024  Prepared by: Dona Borja    Program Notes  Start with good upright posture then proceed to exercises.     Exercises  - Seated Cervical Retraction  - 3-4 x daily - 7 x weekly - 1 sets - 10 reps - 5 sec hold  - Standing Cervical Retraction with Sidebending  - 3 x daily - 7 x weekly - 1 sets - 10 reps - 3 hold  - Jaw Opening  - 3 x daily - 7 x weekly - 1 sets - 6 reps - 5 sec hold  - Smile   - 3 x daily - 7 x weekly - 1 sets - 10 reps - 2 sec hold  - Lip Pursing   - 3 x daily - 7 x weekly - 1 sets - 10 reps - 2 sec hold    Charges: there ex: 2 manual; 1        Total Timed Treatment:  45 min  Total Treatment Time: 45 min

## 2024-03-20 ENCOUNTER — OFFICE VISIT (OUTPATIENT)
Dept: PHYSICAL THERAPY | Age: 39
End: 2024-03-20
Attending: Other
Payer: COMMERCIAL

## 2024-03-20 PROCEDURE — 97110 THERAPEUTIC EXERCISES: CPT

## 2024-03-20 PROCEDURE — 97140 MANUAL THERAPY 1/> REGIONS: CPT

## 2024-03-20 NOTE — PROGRESS NOTES
Diagnosis:   TMJ dysfunction (M26.609)   Spastic quadriplegia (HCC) (G82.50)  History of cerebral palsy (Z86.69)          Referring Provider: Angelina  Date of Evaluation:    2/28/2024     Precautions:   CP Next MD visit:   none scheduled  Date of Surgery: n/a   Insurance Primary/Secondary: BCBS POS / N/A     # Auth Visits: BCBS no limit            Subjective: Pt states states not really noticing while eating. No issues with the HEP. She states minimal triggers with this. She states some tightness in anterior neck.   She states it seems to be happening less often.   Pain: 0/10- not chief complaint  Chief complaint: anterior jaw spasms- identifies chin- unable to confirm or deny if jaw motion is present with spasms or if they are just local and muscular in nature    Objective:   Pursed lips: unremarkable for spasm     Isometric TMJ depression: unremarkable     Cervical AROM: SB: R: 40 deg; L; 30 deg*tightness R side neck   Cervical extension: WFL *tightness in anterior neck     Palpation: no tenderness to palpation, tightness in R SCM and scalenes    Neck Disability Index Score  Score: 34 % (2/14/2024 12:31 AM)    Assessment: Pt had only 1 spasm during therapy session. She was able to release neck tension during SCM stretch indep> HEP updated. At this time frequency of spasms released and less consistent mechanical triggers notes. Appropriate for tapering toward discharge. See updated POC.     Goals: (to be met in 8 visits) Progressing toward goals 3/18/2024    Pt will demonstrate ability to complete opening to 50cm with no subjective tightness in order to maximize eating and chewing capacity.   Pt will demonstrate ability to bite down on the R side without spasm or pain.   Pt will demonstrate appropriate upper cervical posture without cues in order to maximize TMJ function and alignment.   Pt will be indep with HEP.     Plan: Continue per plan of care tapering to 1x/1-2 weeks. Plan for next therapy session:assess  frequency of spasm- results of HEP compliance and timing with spasm   Date: 3/11/2024  TX#: 2/8  Date:   3/13/2024               TX#: 3/8  Date: 3/18/2024                 TX#: 4/8  Date:3/20/2024                  TX#: 5/8  Date:   Tx#: 6/   Manual: 15 min   STM to masseter, temporalis, SCM, upper traps, scalenes on the R   PROM L SB cervical spine 10 sec hold 10 reps to tolerance   Lateral glide gr III TMJ 10 reps on the R x 2 sets    Manual: 15 min   STM to masseter, temporalis, SCM, upper traps, scalenes on the R   STM to mentalis, depressor labii inferioris 5 min   PROM L SB cervical spine 10 sec hold 10 reps to tolerance   Lateral glide gr III TMJ 10 reps on the L x 2 sets  Manual: 15 min   STM to masseter, temporalis, SCM, upper traps, scalenes on the R, medial pterygoid  STM to mentalis, depressor labii inferioris 5 min   PROM L SB cervical spine 10 sec hold 10 reps to tolerance    Manual: 10 min   STM to masseter, temporalis, SCM, upper traps, scalenes on the R, medial pterygoid  STM to mentalis, depressor labii inferioris 5 min   STM to SCM on the R - release 2 min   PROM L SB cervical spine 10 sec hold 10 reps to tolerance       There ex:  25 min   Cervical retraction against headrest 10 reps 3 sec hold   Cervical retraction + rotation 10 reps   Cervical L SB 10 reps 2 sec hold   Bite and roll- tube 10 reps x 2 sets- pain free   Controlled opening- difficulty coordinating tongue- partial range 10 reps x 2 sets - intermittent pain free popping noted   Pt education: HEP updates and benefits  There ex:  25 min   Cervical retraction against headrest 10 reps 3 sec hold   Cervical retraction + rotation 10 reps   Cervical L SB 10 reps 2 sec hold   Controlled opening 10 reps   Facial exercises: smile 10 reps; grimace 10 reps; puff cheeks 10 reps   Pt education: HEP updates and benefits    There ex:  30 min   Cervical retraction against headrest 10 reps 5 sec hold x 2 sets   Cervical retraction + rotation 10 reps    TMJ bite and hold 5 sec x 3 sets - no spasm noted  Cervical L SB 10 reps 2 sec hold - TCs, VCs  opening TMJ 5 reps 5 sec hold x 2 sets   TMJ lateral deviation 5 reps R/L   Facial exercises: smile 10 reps; grimace 10 reps; puff cheeks 10 reps, pucker *triggers spasm with each repetition- pre treatment; unremarkable post stretches  Pt education: timing and use of exercises- to complete when tension in neck and spasm in jaw There ex:  30 min   Cervical retraction against headrest 10 reps 5 sec hold x 2 sets   opening TMJ 5 reps 5 sec hold x 2 sets   Self SCM stretch 10 sec hold 5 reps   Facial exercises smile 10 reps, pucker 10 reps   TMJ lateral deviation 5 reps R/L   TMJ bite and hold 5 sec x 3 sets - no spasm noted  Pt education: timing and use of HEP; recommendations for updated POC and updated HEP - SCM stretches                      Access Code: [email or web address]  URL: [email or web address]  Date: 03/18/2024  Prepared by: Dona Borja    Program Notes  Start with good upright posture then proceed to exercises.     Exercises  - Seated Cervical Retraction  - 3-4 x daily - 7 x weekly - 1 sets - 10 reps - 5 sec hold  - Standing Cervical Retraction with Sidebending  - 3 x daily - 7 x weekly - 1 sets - 10 reps - 3 hold  - Jaw Opening  - 3 x daily - 7 x weekly - 1 sets - 6 reps - 5 sec hold  - Smile   - 3 x daily - 7 x weekly - 1 sets - 10 reps - 2 sec hold  - Lip Pursing   - 3 x daily - 7 x weekly - 1 sets - 10 reps - 2 sec hold  3/20/2024 SCM stretch     Charges: there ex: 2 manual; 1        Total Timed Treatment:  40 min  Total Treatment Time: 40 min

## 2024-03-22 RX ORDER — BENZONATATE 100 MG/1
100 CAPSULE ORAL 3 TIMES DAILY PRN
Qty: 30 CAPSULE | Refills: 0 | Status: SHIPPED | OUTPATIENT
Start: 2024-03-22 | End: 2024-04-21

## 2024-03-22 NOTE — TELEPHONE ENCOUNTER
.A refill request was received for:  Requested Prescriptions     Pending Prescriptions Disp Refills    benzonatate 100 MG Oral Cap 30 capsule 0     Sig: Take 1 capsule (100 mg total) by mouth 3 (three) times daily as needed for cough.       Last refill date:   3/11/2024    Last office visit: 1/10/2024    Follow up due:  Future Appointments   Date Time Provider Department Center   3/27/2024  5:00 PM Dona Borja, PT [facility]   4/11/2024  2:45 PM Dona Borja, PT [facility]   4/12/2024  8:30 AM Erick Mariano OT [facility]   4/17/2024 11:45 AM Erick Mariano, OT [facility]   4/23/2024 10:15 AM Erick Mariano, OT [facility]   4/25/2024 12:30 PM Erick Mariano, OT [facility]   4/30/2024 10:15 AM Erick Mariano OT [facility]   5/3/2024 11:45 AM Erick Mariano OT [facility]   5/7/2024 11:00 AM Erick Mariano, OT [facility]   5/9/2024  6:00 PM Erick Mariano OT [facility]   5/10/2024  1:10 PM Joselito Alfaro MD [facility]   5/14/2024 11:00 AM Erick Mariano OT [facility]   5/16/2024 11:45 AM Erick Mariano OT [facility]   5/21/2024 10:15 AM Erick Mariano, OT [facility]   5/23/2024 11:45 AM GarcíaErick, OT [facility]

## 2024-03-25 ENCOUNTER — APPOINTMENT (OUTPATIENT)
Dept: PHYSICAL THERAPY | Age: 39
End: 2024-03-25
Attending: Other
Payer: COMMERCIAL

## 2024-03-26 ENCOUNTER — APPOINTMENT (OUTPATIENT)
Dept: OCCUPATIONAL MEDICINE | Age: 39
End: 2024-03-26
Attending: INTERNAL MEDICINE
Payer: COMMERCIAL

## 2024-03-27 ENCOUNTER — OFFICE VISIT (OUTPATIENT)
Dept: PHYSICAL THERAPY | Age: 39
End: 2024-03-27
Attending: Other
Payer: COMMERCIAL

## 2024-03-27 PROCEDURE — 97110 THERAPEUTIC EXERCISES: CPT

## 2024-03-27 PROCEDURE — 97140 MANUAL THERAPY 1/> REGIONS: CPT

## 2024-03-27 NOTE — PROGRESS NOTES
Diagnosis:   TMJ dysfunction (M26.609)   Spastic quadriplegia (HCC) (G82.50)  History of cerebral palsy (Z86.69)          Referring Provider: Angelina  Date of Evaluation:    2/28/2024     Precautions:   CP Next MD visit:   none scheduled  Date of Surgery: n/a   Insurance Primary/Secondary: BCBS POS / N/A     # Auth Visits: BCBS no limit            Subjective: Pt states spasm more over the last couple days. She is unsure of anything different. Prior to this felt it might have been a little better. Doing HEP at home- no spasms triggered. She states when having spasms and completing HEP it does relieve the spasms. This morning it has been happening every 5 min or so.   She states it seems to be happening less often.   Pain: 0/10- not chief complaint  Chief complaint: anterior jaw spasms- identifies chin- unable to confirm or deny if jaw motion is present with spasms or if they are just local and muscular in nature    Objective:   Pursed lips: unremarkable for spasm     Isometric TMJ depression: unremarkable     Cervical AROM: SB: R: 20 deg; L; 12 deg*tightness R side neck   Rotation: R WFL: L: WFL   Cervical extension: WFL *tightness in anterior neck     Palpation: no tenderness to palpation, tightness in R SCM and scalenes    Neck Disability Index Score  Score: 34 % (2/14/2024 12:31 AM)    Assessment: Pt had more spasms during therapy session than last therapy session. She does continue to have chronic cough, pt reports this may contribute to additional spasms. Symptoms were not mechanically or consistently triggered during therapy session. Pt was extensively educated on timing and use of HEP, recommendations for HEP, contacting physician re any additional concerns over chronic coughing. Educated on plan of care updates including tapering toward indep HEP program.     Goals: (to be met in 8 visits) Progressing toward goals 3/27/2024     Pt will demonstrate ability to complete opening to 50cm with no subjective  tightness in order to maximize eating and chewing capacity.   Pt will demonstrate ability to bite down on the R side without spasm or pain.   Pt will demonstrate appropriate upper cervical posture without cues in order to maximize TMJ function and alignment.   Pt will be indep with HEP.     Plan: Continue per plan of care tapering to 1x/2 weeks. Plan for next therapy session:assess frequency of spasm- results of HEP compliance and timing with spasm   Date: 3/11/2024  TX#: 2/8  Date:   3/13/2024               TX#: 3/8  Date: 3/18/2024                 TX#: 4/8  Date:3/20/2024                  TX#: 5/8  Date: 3/27/2024   Tx#: 6/8    Manual: 15 min   STM to masseter, temporalis, SCM, upper traps, scalenes on the R   PROM L SB cervical spine 10 sec hold 10 reps to tolerance   Lateral glide gr III TMJ 10 reps on the R x 2 sets    Manual: 15 min   STM to masseter, temporalis, SCM, upper traps, scalenes on the R   STM to mentalis, depressor labii inferioris 5 min   PROM L SB cervical spine 10 sec hold 10 reps to tolerance   Lateral glide gr III TMJ 10 reps on the L x 2 sets  Manual: 15 min   STM to masseter, temporalis, SCM, upper traps, scalenes on the R, medial pterygoid  STM to mentalis, depressor labii inferioris 5 min   PROM L SB cervical spine 10 sec hold 10 reps to tolerance    Manual: 10 min   STM to masseter, temporalis, SCM, upper traps, scalenes on the R, medial pterygoid  STM to mentalis, depressor labii inferioris 5 min   STM to SCM on the R - release 2 min   PROM L SB cervical spine 10 sec hold 10 reps to tolerance    Manual: 15 min   STM to masseter, temporalis, SCM, upper traps, scalenes on the R, medial pterygoid  STM to mentalis, depressor labii inferioris 5 min   STM to SCM on the R - release 2 min   PROM L SB cervical spine 10 sec hold 10 reps to tolerance   TMJ depression gr III 10 reps R x 2 sets; L 10 reps   Lateral glide gr III TMJ 10 reps on the R x 2 sets    There ex:  25 min   Cervical retraction  against headrest 10 reps 3 sec hold   Cervical retraction + rotation 10 reps   Cervical L SB 10 reps 2 sec hold   Bite and roll- tube 10 reps x 2 sets- pain free   Controlled opening- difficulty coordinating tongue- partial range 10 reps x 2 sets - intermittent pain free popping noted   Pt education: HEP updates and benefits  There ex:  25 min   Cervical retraction against headrest 10 reps 3 sec hold   Cervical retraction + rotation 10 reps   Cervical L SB 10 reps 2 sec hold   Controlled opening 10 reps   Facial exercises: smile 10 reps; grimace 10 reps; puff cheeks 10 reps   Pt education: HEP updates and benefits    There ex:  30 min   Cervical retraction against headrest 10 reps 5 sec hold x 2 sets   Cervical retraction + rotation 10 reps   TMJ bite and hold 5 sec x 3 sets - no spasm noted  Cervical L SB 10 reps 2 sec hold - TCs, VCs  opening TMJ 5 reps 5 sec hold x 2 sets   TMJ lateral deviation 5 reps R/L   Facial exercises: smile 10 reps; grimace 10 reps; puff cheeks 10 reps, pucker *triggers spasm with each repetition- pre treatment; unremarkable post stretches  Pt education: timing and use of exercises- to complete when tension in neck and spasm in jaw There ex:  30 min   Cervical retraction against headrest 10 reps 5 sec hold x 2 sets   opening TMJ 5 reps 5 sec hold x 2 sets   Self SCM stretch 10 sec hold 5 reps   Facial exercises smile 10 reps, pucker 10 reps   TMJ lateral deviation 5 reps R/L   TMJ bite and hold 5 sec x 3 sets - no spasm noted  Pt education: timing and use of HEP; recommendations for updated POC and updated HEP - SCM stretches     There ex:  25 min  Cervical retraction against headrest 10 reps 5 sec hold x 2 sets   opening TMJ 5 reps 5 sec hold x 2 sets   Self SCM stretch 10 sec hold 5 reps  - requires repeated demonstrative or visual cues  Facial exercises smile 10 reps, pucker 10 reps; cheek puff 10 reps   TMJ lateral deviation 5 reps R/L   TMJ bite and hold 5 sec x 3 sets - no spasm  noted  Pt education: HEP updates, use and timing, plan of care                   Access Code: [email or web address]  URL: [email or web address]  Date: 03/18/2024  Prepared by: Dona Borja    Program Notes  Start with good upright posture then proceed to exercises.     Exercises  - Seated Cervical Retraction  - 3-4 x daily - 7 x weekly - 1 sets - 10 reps - 5 sec hold  - Standing Cervical Retraction with Sidebending  - 3 x daily - 7 x weekly - 1 sets - 10 reps - 3 hold  - Jaw Opening  - 3 x daily - 7 x weekly - 1 sets - 6 reps - 5 sec hold  - Smile   - 3 x daily - 7 x weekly - 1 sets - 10 reps - 2 sec hold  - Cheek puffing- 3 x daily - 7 x weekly - 1 sets - 10 reps - 2 sec hold  3/20/2024 SCM stretch     Charges: there ex: 2 manual; 1        Total Timed Treatment:  40 min  Total Treatment Time: 40 min

## 2024-03-29 ENCOUNTER — APPOINTMENT (OUTPATIENT)
Dept: OCCUPATIONAL MEDICINE | Age: 39
End: 2024-03-29
Payer: COMMERCIAL

## 2024-04-02 ENCOUNTER — APPOINTMENT (OUTPATIENT)
Dept: OCCUPATIONAL MEDICINE | Age: 39
End: 2024-04-02
Payer: COMMERCIAL

## 2024-04-02 RX ORDER — BENZONATATE 100 MG/1
100 CAPSULE ORAL 3 TIMES DAILY PRN
Qty: 30 CAPSULE | Refills: 0 | OUTPATIENT
Start: 2024-04-02 | End: 2024-05-02

## 2024-04-02 RX ORDER — BENZONATATE 100 MG/1
100 CAPSULE ORAL 3 TIMES DAILY PRN
Qty: 30 CAPSULE | Refills: 0 | OUTPATIENT
Start: 2024-04-02

## 2024-04-02 NOTE — TELEPHONE ENCOUNTER
A refill request was received for:  Requested Prescriptions     Pending Prescriptions Disp Refills    benzonatate 100 MG Oral Cap 30 capsule 0     Sig: Take 1 capsule (100 mg total) by mouth 3 (three) times daily as needed for cough.       Last refill date:   3-22-24    Last office visit:     Follow up due:  Future Appointments   Date Time Provider Department Center   4/11/2024  2:45 PM Dona Borja, PT [facility]   4/12/2024  8:30 AM Erick Mariano OT [facility]   4/17/2024 11:45 AM Erick Mariano OT [facility]   4/23/2024 10:15 AM Erick Mariano, OT [facility]   4/25/2024 12:30 PM Erick Mariano OT [facility]   4/30/2024 10:15 AM Erick Mariano OT [facility]   5/3/2024 11:45 AM Erick Mariano OT [facility]   5/7/2024 11:00 AM Erick Mariano OT [facility]   5/9/2024  6:00 PM Erick Mariano OT [facility]   5/10/2024  1:10 PM Joselito Alfaro MD [facility]   5/14/2024 11:00 AM Erick Mariano OT [facility]   5/16/2024 11:45 AM Erick Mariano OT [facility]   5/21/2024 10:15 AM Erick Mariano OT [facility]   5/23/2024 11:45 AM Erick Mariano, OT [facility]

## 2024-04-04 ENCOUNTER — APPOINTMENT (OUTPATIENT)
Dept: OCCUPATIONAL MEDICINE | Age: 39
End: 2024-04-04
Payer: COMMERCIAL

## 2024-04-09 ENCOUNTER — APPOINTMENT (OUTPATIENT)
Dept: OCCUPATIONAL MEDICINE | Age: 39
End: 2024-04-09
Payer: COMMERCIAL

## 2024-04-10 ENCOUNTER — TELEPHONE (OUTPATIENT)
Dept: PHYSICAL THERAPY | Facility: HOSPITAL | Age: 39
End: 2024-04-10

## 2024-04-11 ENCOUNTER — OFFICE VISIT (OUTPATIENT)
Dept: PHYSICAL THERAPY | Age: 39
End: 2024-04-11
Attending: Other
Payer: COMMERCIAL

## 2024-04-11 PROCEDURE — 97110 THERAPEUTIC EXERCISES: CPT

## 2024-04-11 PROCEDURE — 97140 MANUAL THERAPY 1/> REGIONS: CPT

## 2024-04-11 NOTE — PROGRESS NOTES
Diagnosis:   TMJ dysfunction (M26.609)   Spastic quadriplegia (HCC) (G82.50)  History of cerebral palsy (Z86.69)          Referring Provider: Angelina  Date of Evaluation:    2/28/2024     Precautions:   CP Next MD visit:   none scheduled  Date of Surgery: n/a   Insurance Primary/Secondary: BCBS POS / N/A     # Auth Visits: BCBS no limit           Progress  Discharge Summary  Pt has attended 7 visits in Physical Therapy. -    Subjective: Pt states exercises are going ok. Spasms still happening. Has not noticed pattern or new trends since last therapy session. She feels like over the course of the last month- it feels times there are better and times it is same.   She states it seems to be happening less often.   Pain: 0/10- not chief complaint  Chief complaint: anterior jaw spasms- identifies chin- unable to confirm or deny if jaw motion is present with spasms or if they are just local and muscular in nature    Objective:   TMJ AROM:   Open (45mm) 55mm *popping no pain   Protrusion (6-8mm): 8 mm  Lateral (10-15mm): R: 10cm*spasm- no spasm with repeated measurements L:10cm    Cotton Roll Test: bite on molars to unload joint: if pain increases: muscular: negative       If pain decreases: joint or retro discal: negative    Opening Pattern: \"s\" curve (motor control): +     \"C\" curve (disc issue): -    Pursed lips: unremarkable for spasm     Isometric TMJ depression: unremarkable     Cervical AROM:   SB: R: 20 deg; L; 12 deg*tightness R side neck   Rotation: R WFL: L: WFL   Cervical extension: WFL *tightness in anterior neck   Cervical flexion WFL     Palpation: no tenderness to palpation, tightness in R SCM and scalenes    Neck Disability Index Score  Score: 34 % (2/14/2024 12:31 AM)  Post Neck Disability Index Score  Post Score: 28 % (4/11/2024  3:24 PM)    6 % improvement      Assessment: Pt had less spasms again during therapy session. They are observational but non mechanical in nature at this time. No consistent  reproduction any longer. She does not get direct relief from exercises or manual intervention. However, they appear episodic in nature. Pt was provided instructions, contact information for any changes in status. See POC below.     Goals: (to be met in 8 visits)  Met all goals   Pt will demonstrate ability to complete opening to 50cm with no subjective tightness in order to maximize eating and chewing capacity. - MET  Pt will demonstrate ability to bite down on the R side without spasm or pain. - MET  Pt will demonstrate appropriate upper cervical posture without cues in order to maximize TMJ function and alignment. - Met only temporary   Pt will be indep with HEP. - MET    Plan: Hold therapy for plateau in progress and difficulty with mechanical reproduction of symptoms.   Patient/Family/Caregiver was advised of these findings, precautions, and treatment options and has agreed to actively participate in planning and for this course of care.    Thank you for your referral. If you have any questions, please contact me at Dept: [phone]    Sincerely,  Electronically signed by therapist: Dona Borja, PT   Date: 3/11/2024  TX#: 2/8  Date:   3/13/2024               TX#: 3/8  Date: 3/18/2024                 TX#: 4/8  Date:3/20/2024                  TX#: 5/8  Date: 3/27/2024   Tx#: 6/8 4/11/2024   Tx#; 7/8   Manual: 15 min   STM to masseter, temporalis, SCM, upper traps, scalenes on the R   PROM L SB cervical spine 10 sec hold 10 reps to tolerance   Lateral glide gr III TMJ 10 reps on the R x 2 sets    Manual: 15 min   STM to masseter, temporalis, SCM, upper traps, scalenes on the R   STM to mentalis, depressor labii inferioris 5 min   PROM L SB cervical spine 10 sec hold 10 reps to tolerance   Lateral glide gr III TMJ 10 reps on the L x 2 sets  Manual: 15 min   STM to masseter, temporalis, SCM, upper traps, scalenes on the R, medial pterygoid  STM to mentalis, depressor labii inferioris 5 min   PROM L SB cervical  spine 10 sec hold 10 reps to tolerance    Manual: 10 min   STM to masseter, temporalis, SCM, upper traps, scalenes on the R, medial pterygoid  STM to mentalis, depressor labii inferioris 5 min   STM to SCM on the R - release 2 min   PROM L SB cervical spine 10 sec hold 10 reps to tolerance    Manual: 15 min   STM to masseter, temporalis, SCM, upper traps, scalenes on the R, medial pterygoid  STM to mentalis, depressor labii inferioris 5 min   STM to SCM on the R - release 2 min   PROM L SB cervical spine 10 sec hold 10 reps to tolerance   TMJ depression gr III 10 reps R x 2 sets; L 10 reps   Lateral glide gr III TMJ 10 reps on the R x 2 sets  Manual: 15 min   STM to masseter, temporalis, SCM, upper traps, scalenes on the R, medial pterygoid  STM to mentalis, depressor labii inferioris 5 min   STM to SCM on the R - release 2 min   PROM L SB cervical spine 10 sec hold 10 reps to tolerance   Lateral glide gr III TMJ 10 reps on the R x 2 sets    There ex:  25 min   Cervical retraction against headrest 10 reps 3 sec hold   Cervical retraction + rotation 10 reps   Cervical L SB 10 reps 2 sec hold   Bite and roll- tube 10 reps x 2 sets- pain free   Controlled opening- difficulty coordinating tongue- partial range 10 reps x 2 sets - intermittent pain free popping noted   Pt education: HEP updates and benefits  There ex:  25 min   Cervical retraction against headrest 10 reps 3 sec hold   Cervical retraction + rotation 10 reps   Cervical L SB 10 reps 2 sec hold   Controlled opening 10 reps   Facial exercises: smile 10 reps; grimace 10 reps; puff cheeks 10 reps   Pt education: HEP updates and benefits    There ex:  30 min   Cervical retraction against headrest 10 reps 5 sec hold x 2 sets   Cervical retraction + rotation 10 reps   TMJ bite and hold 5 sec x 3 sets - no spasm noted  Cervical L SB 10 reps 2 sec hold - TCs, VCs  opening TMJ 5 reps 5 sec hold x 2 sets   TMJ lateral deviation 5 reps R/L   Facial exercises: smile 10  reps; grimace 10 reps; puff cheeks 10 reps, pucker *triggers spasm with each repetition- pre treatment; unremarkable post stretches  Pt education: timing and use of exercises- to complete when tension in neck and spasm in jaw There ex:  30 min   Cervical retraction against headrest 10 reps 5 sec hold x 2 sets   opening TMJ 5 reps 5 sec hold x 2 sets   Self SCM stretch 10 sec hold 5 reps   Facial exercises smile 10 reps, pucker 10 reps   TMJ lateral deviation 5 reps R/L   TMJ bite and hold 5 sec x 3 sets - no spasm noted  Pt education: timing and use of HEP; recommendations for updated POC and updated HEP - SCM stretches     There ex:  25 min  Cervical retraction against headrest 10 reps 5 sec hold x 2 sets   opening TMJ 5 reps 5 sec hold x 2 sets   Self SCM stretch 10 sec hold 5 reps  - requires repeated demonstrative or visual cues  Facial exercises smile 10 reps, pucker 10 reps; cheek puff 10 reps   TMJ lateral deviation 5 reps R/L   TMJ bite and hold 5 sec x 3 sets - no spasm noted  Pt education: HEP updates, use and timing, plan of care   There ex:  25 min  Cervical retraction against headrest 10 reps 5 sec hold x 2 sets   opening TMJ 5 reps 5 sec hold x 2 sets   Self SCM stretch 10 sec hold 5 reps  - requires repeated demonstrative or visual cues  Facial exercises smile 10 reps, pucker 10 reps; cheek puff 10 reps   TMJ lateral deviation 5 reps R/L   TMJ bite and hold 5 sec x 3 sets - no spasm noted  Asessment of objective above  Pt education: POC contact information provided, HEP recommendations, answered all questions and concerns                   Access Code: [email or web address]  URL: [email or web address]  Date: 03/18/2024  Prepared by: Dona Borja    Program Notes  Start with good upright posture then proceed to exercises.     Exercises  - Seated Cervical Retraction  - 3-4 x daily - 7 x weekly - 1 sets - 10 reps - 5 sec hold  - Standing Cervical Retraction with Sidebending  - 3 x daily - 7 x weekly - 1 sets  - 10 reps - 3 hold  - Jaw Opening  - 3 x daily - 7 x weekly - 1 sets - 6 reps - 5 sec hold  - Smile   - 3 x daily - 7 x weekly - 1 sets - 10 reps - 2 sec hold  - Cheek puffing- 3 x daily - 7 x weekly - 1 sets - 10 reps - 2 sec hold  3/20/2024 SCM stretch     Charges: there ex: 2 manual; 1        Total Timed Treatment:  40 min  Total Treatment Time: 40 min

## 2024-04-12 ENCOUNTER — OFFICE VISIT (OUTPATIENT)
Dept: OCCUPATIONAL MEDICINE | Facility: HOSPITAL | Age: 39
End: 2024-04-12
Attending: FAMILY MEDICINE
Payer: COMMERCIAL

## 2024-04-12 ENCOUNTER — APPOINTMENT (OUTPATIENT)
Dept: OCCUPATIONAL MEDICINE | Age: 39
End: 2024-04-12
Payer: COMMERCIAL

## 2024-04-12 DIAGNOSIS — G80.9 CEREBRAL PALSY (HCC): Primary | ICD-10-CM

## 2024-04-12 PROCEDURE — 97167 OT EVAL HIGH COMPLEX 60 MIN: CPT

## 2024-04-12 PROCEDURE — 97110 THERAPEUTIC EXERCISES: CPT

## 2024-04-12 NOTE — PROGRESS NOTES
OCCUPATIONAL THERAPY UPPER EXTREMITY EVALUATION     Diagnosis:   Cerebral palsy (HCC) (G80.9)       Referring Provider: Bhaskar Lacy  Date of Evaluation:    4/12/2024    Precautions:   Cerebral Palsy Next MD visit:   none scheduled  Date of Surgery: n/a     PATIENT SUMMARY   Jaye Jones is a 39 year old female who presents to therapy today with complaints of LUE muscle spasms that have worsened over the past few months. Patient notes baseline of having muscle spasms due to diagnosis of cerebral palsy but that frequency and symptoms have worsened since having bronchitis (severe coughing episodes) last November 2023 resulting in TMJ stiffness/muscle spasms and increased tightness in her LUE. Patient recently saw PT to address TMJ muscle spasms. Patient notes that LUE muscle spasms are present at baseline and that she is currently taking baclofen with symptoms slowly improving over the past few months but still present. OT services received physician's orders for evaluation and treatment as indicated to maximize rehab potential.  Pt describes pain level current 0/10, at best 0/10, at worst 0/10. Patient denies any pain or numbness/tingling.  Current functional limitations include typing on computer, feeding, gripping, lifting, and overall function in LUE.    Jaye describes prior level of function as functionally (I) w/ typing and feeding. Patient does require assistance at baseline for ADL/IADL management due to CP.   Employment:  n/a  Hand Dominance: left  Living Situation: Family  Imaging/Tests: n/a  Pt goals include assist in managing muscle spasms of LUE.  Past medical history was reviewed with Jaye. Significant findings include cerebral palsy and muscle spasms.    ASSESSMENT  Jaye presents to occupational therapy evaluation with primary c/o LUE muscle spasms that have worsened over the past few months. Patient notes baseline of having muscle spasms due to diagnosis of cerebral palsy but that  frequency and symptoms have worsened since having bronchitis (severe coughing episodes) last November 2023 resulting in TMJ stiffness/muscle spasms and increased tightness in her LUE. Patient recently saw PT to address TMJ muscle spasms. Patient notes that LUE muscle spasms are present at baseline and that she is currently taking baclofen with symptoms slowly improving over the past few months but still present. OT services received physician's orders for evaluation and treatment as indicated to maximize rehab potential. The results of the objective tests and measures show ROM deficits and muscle spasms. Functional deficits include but are not limited to typing on computer, feeding, gripping, lifting, and overall function in LUE. Signs and symptoms are consistent with diagnosis of cerebral palsy. Pt and OT discussed evaluation findings, pathology, POC and HEP.  Pt voiced understanding and performs HEP correctly without reported pain. Skilled Occupational Therapy is medically necessary to address the above impairments and reach functional goals.     OBJECTIVE    OBSERVATION: Patient presents with frequent muscle spasms with patient noting triggers including hand movements, typing, sleeping, and sometimes when she is doing nothing. Patient notes slight improvement since increase in Baclofen dosage in Feb 2024. Spasms have patient extension arm in D2 positioning occurring multiple times a day and lasting a few seconds at a time.    ORTHOTICS: n/a    SENSORY: Patient denies any numbness/tingling.    EDEMA: n/a    AROM (degrees): Patient presents with flexed positioning of bilateral digits. Shoulder, elbow, and wrist movements WFL with compensatory strategies and tendency towards D2 extension positioning to extend digits.    MANUAL MUSCLE TESTING:   Shoulder Flexion: R=NT; L=4/5  Shoulder Abduction: R=NT; L=4/5  Shoulder External Rotation: R=NT; L=4/5  Shoulder Internal Rotation: R=NT; L=4/5  Elbow Flexion: R=NT;  L=4+/5  Elbow Extension: R=NT; L=4/5  Forearm Pronation: R=NT; L=4/5  Forearm Supination: R=NT; L=2+/5  Wrist Flexion: R=NT; L=4+/5  Wrist Extension: R=NT; L=4/5    Strength (lbs) Right Average Left Average   : 4.0 lbs 16.4 lbs   2 pt Pinch: NT /nt   3 pt Pinch: NT NT   Lateral Pinch: NT NT       Today’s Treatment and Response:   Pt education was provided on exam findings, treatment diagnosis, treatment plan, expectations, and prognosis. Pt was also provided recommendations for use of moist heat and HEP exercises.  Patient was instructed in and issued a HEP for:   -AROM/AAROM Stretching Routine for Shoulders, Elbows, Wrists  -Tendon Gliding Exercises  -Thumb ROM Exercises    Charges: OT Eval: High Complexity, TEx1      Total Timed Treatment: 45 min     Total Treatment Time: 45 min     Based on clinical rationale and outcome measures, this evaluation involved High Complexity decision making due to 3+ personal factors/comorbidities, 3 body structures involved/activity limitations, and evolving symptoms including  muscle spasm symptoms .  PLAN OF CARE   Goals: (to be met in 12 visits)   1. Patient will be independent with daily LUE stretching routine indicating increased ability to manage functional ADL/IADLs  2. Patient will report infrequent incidence of LUE muscle spasms daily <15 indicating increased management of symptoms.  3. Patient will demonstrate at least 20.0 lbs or greater L  strength indicating increased ability to manage functional daily tasks.  4. Patient will be independent and compliant with comprehensive HEP to maintain progress achieved in OT.    Frequency / Duration: Patient will be seen for 2x/week or a total of 12 visits over a 90 day period.  Treatment will include: Manual Therapy, Neuromuscular Re-education, Self-Care Home Management, Therapeutic Activities, Therapeutic Exercise, and Home Exercise Program instruction, Splinting, Modalities PRN    Education or treatment limitation:  None  Rehab Potential:good    QuickDASH Outcome Score  Score: 56.82 % (4/5/2024 10:07 AM)      Patient/Family/Caregiver was advised of these findings, precautions, and treatment options and has agreed to actively participate in planning and for this course of care.    Thank you for your referral. Please co-sign or sign and return this letter via fax as soon as possible to [phone]. If you have any questions, please contact me at Dept: [phone]    Sincerely,  Electronically signed by therapist: Erick Mariano, OT  Physician's certification required: Yes  I certify the need for these services furnished under this plan of treatment and while under my care.    X___________________________________________________ Date____________________    Certification From: 4/12/2024  To:7/11/2024

## 2024-04-16 ENCOUNTER — APPOINTMENT (OUTPATIENT)
Dept: OCCUPATIONAL MEDICINE | Age: 39
End: 2024-04-16
Payer: COMMERCIAL

## 2024-04-17 ENCOUNTER — OFFICE VISIT (OUTPATIENT)
Dept: OCCUPATIONAL MEDICINE | Facility: HOSPITAL | Age: 39
End: 2024-04-17
Attending: FAMILY MEDICINE
Payer: COMMERCIAL

## 2024-04-17 PROCEDURE — 97140 MANUAL THERAPY 1/> REGIONS: CPT

## 2024-04-17 PROCEDURE — 97110 THERAPEUTIC EXERCISES: CPT

## 2024-04-17 NOTE — PROGRESS NOTES
Diagnosis:   Cerebral palsy (HCC) (G80.9)          Referring Provider: Bhaskar Lacy  Date of Evaluation:    4/12/2024    Precautions:  Cerebral Palsy Next MD visit:   none scheduled  Date of Surgery: n/a   Insurance Primary/Secondary: BCBS POS       # Auth Visits: 2/12            Subjective: Patient presents to OT appt today noting she had busy weekend. Hasn't been able to complete all exercises but has been stretching when possible. Denies pain today.    Objective:     AROM (degrees): Patient presents with flexed positioning of bilateral digits. Shoulder, elbow, and wrist movements WFL with compensatory strategies and tendency towards D2 extension positioning to extend digits.     MANUAL MUSCLE TESTING:   Shoulder Flexion: R=NT; L=4/5  Shoulder Abduction: R=NT; L=4/5  Shoulder External Rotation: R=NT; L=4/5  Shoulder Internal Rotation: R=NT; L=4/5  Elbow Flexion: R=NT; L=4+/5  Elbow Extension: R=NT; L=4/5  Forearm Pronation: R=NT; L=4/5  Forearm Supination: R=NT; L=2+/5  Wrist Flexion: R=NT; L=4+/5  Wrist Extension: R=NT; L=4/5     Strength (lbs) Right Average Left Average   : 4.0 lbs 16.4 lbs   2 pt Pinch: NT NT   3 pt Pinch: NT NT   Lateral Pinch: NT NT       Assessment: Muscle spasms still present to LUE overall mostly triggered by wrist and hand stretching and movement. Manual therapy, STM, and stretching provided today to assist in alleviating symptoms. No significant changes in muscle spasm frequency at this time.      Goals: (to be met in 12 visits)   1. Patient will be independent with daily LUE stretching routine indicating increased ability to manage functional ADL/IADLs  2. Patient will report infrequent incidence of LUE muscle spasms daily <15 indicating increased management of symptoms.  3. Patient will demonstrate at least 20.0 lbs or greater L  strength indicating increased ability to manage functional daily tasks.  4. Patient will be independent and compliant with comprehensive HEP to  maintain progress achieved in OT.     Plan: Patient will continue to be seen for 2x/week or a total of 12 visits over a 90 day period.  Treatment will include: Manual Therapy, Neuromuscular Re-education, Self-Care Home Management, Therapeutic Activities, Therapeutic Exercise, and Home Exercise Program instruction, Splinting, Modalities PRN    Date: 4/17/2024  TX#: 2/12 Date:                 TX#: 3/12 Date:                 TX#: 4/12 Date:                 TX#: 5/12 Date:   Tx#: 6/12   -STM/IASTM to L deltoid, bicep/triceps, forearm flexors/extensors  -Joint Mobilization, Joint Distractions, and PROM stretching for L digits and wrist  -AAROM/PROM stretching for L shoulder, elbow, wrist, and digits         HEP:   -AROM/AAROM Stretching Routine for Shoulders, Elbows, Wrists  -Tendon Gliding Exercises  -Thumb ROM Exercises    Charges: MTx2, TEx1       Total Timed Treatment: 45 min  Total Treatment Time: 45 min

## 2024-04-19 ENCOUNTER — APPOINTMENT (OUTPATIENT)
Dept: OCCUPATIONAL MEDICINE | Age: 39
End: 2024-04-19
Payer: COMMERCIAL

## 2024-04-23 ENCOUNTER — APPOINTMENT (OUTPATIENT)
Dept: OCCUPATIONAL MEDICINE | Age: 39
End: 2024-04-23
Payer: COMMERCIAL

## 2024-04-23 ENCOUNTER — OFFICE VISIT (OUTPATIENT)
Dept: OCCUPATIONAL MEDICINE | Facility: HOSPITAL | Age: 39
End: 2024-04-23
Attending: FAMILY MEDICINE
Payer: COMMERCIAL

## 2024-04-23 PROCEDURE — 97140 MANUAL THERAPY 1/> REGIONS: CPT

## 2024-04-23 PROCEDURE — 97110 THERAPEUTIC EXERCISES: CPT

## 2024-04-23 NOTE — PROGRESS NOTES
Diagnosis:   Cerebral palsy (HCC) (G80.9)          Referring Provider: Bhaskar Lacy  Date of Evaluation:    4/12/2024    Precautions:  Cerebral Palsy Next MD visit:   none scheduled  Date of Surgery: n/a   Insurance Primary/Secondary: BCBS POS       # Auth Visits: 3/12            Subjective: Patient presents to OT appt today noting she has been stretching. Able to complete tasks such as typing and use of L hand and feeling slight improvement in terms of muscle spasm frequency decreasing.    Objective:     AROM (degrees): Patient presents with flexed positioning of bilateral digits. Shoulder, elbow, and wrist movements WFL with compensatory strategies and tendency towards D2 extension positioning to extend digits.     MANUAL MUSCLE TESTING:   Shoulder Flexion: R=NT; L=4/5  Shoulder Abduction: R=NT; L=4/5  Shoulder External Rotation: R=NT; L=4/5  Shoulder Internal Rotation: R=NT; L=4/5  Elbow Flexion: R=NT; L=4+/5  Elbow Extension: R=NT; L=4/5  Forearm Pronation: R=NT; L=4/5  Forearm Supination: R=NT; L=2+/5  Wrist Flexion: R=NT; L=4+/5  Wrist Extension: R=NT; L=4/5     Strength (lbs) Right Average Left Average   : 4.0 lbs 16.4 lbs   2 pt Pinch: NT NT   3 pt Pinch: NT NT   Lateral Pinch: NT NT       Assessment: Muscle spasms still present to LUE overall mostly triggered by wrist and hand stretching and movement. Manual therapy, STM, and stretching provided today to assist in alleviating symptoms. No significant changes in muscle spasm frequency at this time.      Goals: (to be met in 12 visits)   1. Patient will be independent with daily LUE stretching routine indicating increased ability to manage functional ADL/IADLs  2. Patient will report infrequent incidence of LUE muscle spasms daily <15 indicating increased management of symptoms.  3. Patient will demonstrate at least 20.0 lbs or greater L  strength indicating increased ability to manage functional daily tasks.  4. Patient will be independent and  compliant with comprehensive HEP to maintain progress achieved in OT.     Plan: Patient will continue to be seen for 2x/week or a total of 12 visits over a 90 day period.  Treatment will include: Manual Therapy, Neuromuscular Re-education, Self-Care Home Management, Therapeutic Activities, Therapeutic Exercise, and Home Exercise Program instruction, Splinting, Modalities PRN    Date: 4/17/2024  TX#: 2/12 Date: 4/23/2024            TX#: 3/12 Date:                 TX#: 4/12 Date:                 TX#: 5/12 Date:   Tx#: 6/12   -STM/IASTM to L deltoid, bicep/triceps, forearm flexors/extensors  -Joint Mobilization, Joint Distractions, and PROM stretching for L digits and wrist  -AAROM/PROM stretching for L shoulder, elbow, wrist, and digits -STM to L forearm wrist flexors and extensors  -PROM stretching and joint mobilization for L wrist and digits  -AROM slow stretch for digits and   -Tenodesis wrist positioning for digit stretches individual flexion/extension        HEP:   -AROM/AAROM Stretching Routine for Shoulders, Elbows, Wrists  -Tendon Gliding Exercises  -Thumb ROM Exercises    Charges: MTx2, TEx1       Total Timed Treatment: 45 min  Total Treatment Time: 45 min

## 2024-04-25 ENCOUNTER — OFFICE VISIT (OUTPATIENT)
Dept: OCCUPATIONAL MEDICINE | Facility: HOSPITAL | Age: 39
End: 2024-04-25
Attending: FAMILY MEDICINE
Payer: COMMERCIAL

## 2024-04-25 ENCOUNTER — APPOINTMENT (OUTPATIENT)
Dept: OCCUPATIONAL MEDICINE | Age: 39
End: 2024-04-25
Payer: COMMERCIAL

## 2024-04-25 PROCEDURE — 97110 THERAPEUTIC EXERCISES: CPT

## 2024-04-25 PROCEDURE — 97140 MANUAL THERAPY 1/> REGIONS: CPT

## 2024-04-25 NOTE — PROGRESS NOTES
Diagnosis:   Cerebral palsy (HCC) (G80.9)          Referring Provider: Bhaskar Lacy  Date of Evaluation:    4/12/2024    Precautions:  Cerebral Palsy Next MD visit:   none scheduled  Date of Surgery: n/a   Insurance Primary/Secondary: BCBS POS       # Auth Visits: 4/12            Subjective: Patient presents to OT appt today noting she has been stretching and feels like frequency of muscle spasms have been improving.    Objective:     AROM (degrees): Patient presents with flexed positioning of bilateral digits. Shoulder, elbow, and wrist movements WFL with compensatory strategies and tendency towards D2 extension positioning to extend digits.     MANUAL MUSCLE TESTING:   Shoulder Flexion: R=NT; L=4/5  Shoulder Abduction: R=NT; L=4/5  Shoulder External Rotation: R=NT; L=4/5  Shoulder Internal Rotation: R=NT; L=4/5  Elbow Flexion: R=NT; L=4+/5  Elbow Extension: R=NT; L=4/5  Forearm Pronation: R=NT; L=4/5  Forearm Supination: R=NT; L=2+/5  Wrist Flexion: R=NT; L=4+/5  Wrist Extension: R=NT; L=4/5     Strength (lbs) Right Average Left Average   : 4.0 lbs 16.4 lbs   2 pt Pinch: NT NT   3 pt Pinch: NT NT   Lateral Pinch: NT NT       Assessment: Muscle spasms still present to LUE overall mostly triggered by wrist and hand stretching and movement. Manual therapy, STM, and stretching provided today to assist in alleviating symptoms. No significant changes in muscle spasm frequency at this time. Yellow Theraputty strengthening exercises added to HEP today.      Goals: (to be met in 12 visits)   1. Patient will be independent with daily LUE stretching routine indicating increased ability to manage functional ADL/IADLs  2. Patient will report infrequent incidence of LUE muscle spasms daily <15 indicating increased management of symptoms.  3. Patient will demonstrate at least 20.0 lbs or greater L  strength indicating increased ability to manage functional daily tasks.  4. Patient will be independent and compliant  with comprehensive HEP to maintain progress achieved in OT.     Plan: Patient will continue to be seen for 2x/week or a total of 12 visits over a 90 day period.  Treatment will include: Manual Therapy, Neuromuscular Re-education, Self-Care Home Management, Therapeutic Activities, Therapeutic Exercise, and Home Exercise Program instruction, Splinting, Modalities PRN    Date: 4/17/2024  TX#: 2/12 Date: 4/23/2024            TX#: 3/12 Date: 4/25/2024             TX#: 4/12 Date:                 TX#: 5/12 Date:   Tx#: 6/12   -STM/IASTM to L deltoid, bicep/triceps, forearm flexors/extensors  -Joint Mobilization, Joint Distractions, and PROM stretching for L digits and wrist  -AAROM/PROM stretching for L shoulder, elbow, wrist, and digits -STM to L forearm wrist flexors and extensors  -PROM stretching and joint mobilization for L wrist and digits  -AROM slow stretch for digits and   -Tenodesis wrist positioning for digit stretches individual flexion/extension -STM to L forearm wrist flexors and extensors  -PROM stretching and joint mobilization for L wrist and digits  -AROM slow stretch for digits and   -Tenodesis wrist positioning for digit stretches individual flexion/extension  -Yellow Theraputty Strengthening Exercises         HEP:   -AROM/AAROM Stretching Routine for Shoulders, Elbows, Wrists  -Tendon Gliding Exercises  -Thumb ROM Exercises  -Yellow Theraputty Strengthening Exercises    Charges: MTx1, TEx2       Total Timed Treatment: 40 min  Total Treatment Time: 40 min

## 2024-04-30 ENCOUNTER — OFFICE VISIT (OUTPATIENT)
Dept: OCCUPATIONAL MEDICINE | Facility: HOSPITAL | Age: 39
End: 2024-04-30
Attending: FAMILY MEDICINE
Payer: COMMERCIAL

## 2024-04-30 ENCOUNTER — APPOINTMENT (OUTPATIENT)
Dept: OCCUPATIONAL MEDICINE | Age: 39
End: 2024-04-30
Payer: COMMERCIAL

## 2024-04-30 PROCEDURE — 97110 THERAPEUTIC EXERCISES: CPT

## 2024-04-30 PROCEDURE — 97140 MANUAL THERAPY 1/> REGIONS: CPT

## 2024-04-30 NOTE — PROGRESS NOTES
Diagnosis:   Cerebral palsy (HCC) (G80.9)          Referring Provider: Bhaskar Lacy  Date of Evaluation:    4/12/2024    Precautions:  Cerebral Palsy Next MD visit:   none scheduled  Date of Surgery: n/a   Insurance Primary/Secondary: BCBS POS       # Auth Visits: 5/12            Subjective: Patient presents to OT noting she has been completing home exercises. Spasms still occurring but feels frequency is decreasing comparatively.    Objective:     AROM (degrees): Patient presents with flexed positioning of bilateral digits. Shoulder, elbow, and wrist movements WFL with compensatory strategies and tendency towards D2 extension positioning to extend digits.     MANUAL MUSCLE TESTING:   Shoulder Flexion: R=NT; L=4/5  Shoulder Abduction: R=NT; L=4/5  Shoulder External Rotation: R=NT; L=4/5  Shoulder Internal Rotation: R=NT; L=4/5  Elbow Flexion: R=NT; L=4+/5  Elbow Extension: R=NT; L=4/5  Forearm Pronation: R=NT; L=4/5  Forearm Supination: R=NT; L=2+/5  Wrist Flexion: R=NT; L=4+/5  Wrist Extension: R=NT; L=4/5     Strength (lbs) Right Average Left Average   : 4.0 lbs 16.4 lbs   2 pt Pinch: NT NT   3 pt Pinch: NT NT   Lateral Pinch: NT NT       Assessment: Muscle spasms still present to LUE overall mostly triggered by wrist and hand stretching and movement. Manual therapy, STM, and stretching provided today to assist in alleviating symptoms. No significant changes in muscle spasm frequency at this time. Yellow Theraputty strengthening exercises added to HEP today.      Goals: (to be met in 12 visits)   1. Patient will be independent with daily LUE stretching routine indicating increased ability to manage functional ADL/IADLs  2. Patient will report infrequent incidence of LUE muscle spasms daily <15 indicating increased management of symptoms.  3. Patient will demonstrate at least 20.0 lbs or greater L  strength indicating increased ability to manage functional daily tasks.  4. Patient will be independent and  compliant with comprehensive HEP to maintain progress achieved in OT.     Plan: Patient will continue to be seen for 2x/week or a total of 12 visits over a 90 day period.  Treatment will include: Manual Therapy, Neuromuscular Re-education, Self-Care Home Management, Therapeutic Activities, Therapeutic Exercise, and Home Exercise Program instruction, Splinting, Modalities PRN    Date: 4/17/2024  TX#: 2/12 Date: 4/23/2024            TX#: 3/12 Date: 4/25/2024             TX#: 4/12 Date: 4/30/2024             TX#: 5/12 Date:   Tx#: 6/12   -STM/IASTM to L deltoid, bicep/triceps, forearm flexors/extensors  -Joint Mobilization, Joint Distractions, and PROM stretching for L digits and wrist  -AAROM/PROM stretching for L shoulder, elbow, wrist, and digits -STM to L forearm wrist flexors and extensors  -PROM stretching and joint mobilization for L wrist and digits  -AROM slow stretch for digits and thumb  -Tenodesis wrist positioning for digit stretches individual flexion/extension -STM to L forearm wrist flexors and extensors  -PROM stretching and joint mobilization for L wrist and digits  -AROM slow stretch for digits and thumb  -Tenodesis wrist positioning for digit stretches individual flexion/extension  -Yellow Theraputty Strengthening Exercises -STM to L forearm wrist flexors and extensors  -PROM stretching and joint mobilization for L wrist and digits  -AROM slow stretch for digits and thumb  -Tenodesis wrist positioning for digit stretches individual flexion/extension  -RUE STM to forearm wrist flexors/extensors and digit/wrist PROM  -Bilateral reaching LUE for cone stacking and bean bag sorting  -2.5 lb wrist bilateral reaching LUE for cone stacking and bean bag sorting  -Perfection FMC Sorting Activity      HEP:   -AROM/AAROM Stretching Routine for Shoulders, Elbows, Wrists  -Tendon Gliding Exercises  -Thumb ROM Exercises  -Yellow Theraputty Strengthening Exercises    Charges: MTx1, TEx2       Total Timed Treatment:  40 min  Total Treatment Time: 40 min

## 2024-05-03 ENCOUNTER — OFFICE VISIT (OUTPATIENT)
Dept: OCCUPATIONAL MEDICINE | Facility: HOSPITAL | Age: 39
End: 2024-05-03
Payer: COMMERCIAL

## 2024-05-03 ENCOUNTER — APPOINTMENT (OUTPATIENT)
Dept: OCCUPATIONAL MEDICINE | Age: 39
End: 2024-05-03
Payer: COMMERCIAL

## 2024-05-03 PROCEDURE — 97140 MANUAL THERAPY 1/> REGIONS: CPT

## 2024-05-03 PROCEDURE — 97110 THERAPEUTIC EXERCISES: CPT

## 2024-05-03 NOTE — PROGRESS NOTES
Diagnosis:   Cerebral palsy (HCC) (G80.9)          Referring Provider: Bhaskar Lacy  Date of Evaluation:    4/12/2024    Precautions:  Cerebral Palsy Next MD visit:   none scheduled  Date of Surgery: n/a   Insurance Primary/Secondary: BCBS POS       # Auth Visits: 6/12            Subjective: Patient presents to OT noting she has been completing home exercises. Muscle spasms still occurring but patient notes less frequency for LUE.    Objective:     AROM (degrees): Patient presents with flexed positioning of bilateral digits. Shoulder, elbow, and wrist movements WFL with compensatory strategies and tendency towards D2 extension positioning to extend digits.     MANUAL MUSCLE TESTING:   Shoulder Flexion: R=NT; L=4/5  Shoulder Abduction: R=NT; L=4/5  Shoulder External Rotation: R=NT; L=4/5  Shoulder Internal Rotation: R=NT; L=4/5  Elbow Flexion: R=NT; L=4+/5  Elbow Extension: R=NT; L=4/5  Forearm Pronation: R=NT; L=4/5  Forearm Supination: R=NT; L=2+/5  Wrist Flexion: R=NT; L=4+/5  Wrist Extension: R=NT; L=4/5     Strength (lbs) Right Average Left Average   : 4.0 lbs 16.4 lbs   2 pt Pinch: NT NT   3 pt Pinch: NT NT   Lateral Pinch: NT NT       Assessment: Muscle spasms still present to LUE overall mostly triggered by wrist and hand stretching and movement. Manual therapy, STM, and stretching provided today to assist in alleviating symptoms. No significant changes in muscle spasm frequency at this time. Yellow Theraputty strengthening exercises added to HEP today.      Goals: (to be met in 12 visits)   1. Patient will be independent with daily LUE stretching routine indicating increased ability to manage functional ADL/IADLs  2. Patient will report infrequent incidence of LUE muscle spasms daily <15 indicating increased management of symptoms.  3. Patient will demonstrate at least 20.0 lbs or greater L  strength indicating increased ability to manage functional daily tasks.  4. Patient will be independent and  compliant with comprehensive HEP to maintain progress achieved in OT.     Plan: Patient will continue to be seen for 2x/week or a total of 12 visits over a 90 day period.  Treatment will include: Manual Therapy, Neuromuscular Re-education, Self-Care Home Management, Therapeutic Activities, Therapeutic Exercise, and Home Exercise Program instruction, Splinting, Modalities PRN    Date: 4/17/2024  TX#: 2/12 Date: 4/23/2024            TX#: 3/12 Date: 4/25/2024             TX#: 4/12 Date: 4/30/2024             TX#: 5/12 Date: 5/3/2024  TX#: 6/12   -STM/IASTM to L deltoid, bicep/triceps, forearm flexors/extensors  -Joint Mobilization, Joint Distractions, and PROM stretching for L digits and wrist  -AAROM/PROM stretching for L shoulder, elbow, wrist, and digits -STM to L forearm wrist flexors and extensors  -PROM stretching and joint mobilization for L wrist and digits  -AROM slow stretch for digits and thumb  -Tenodesis wrist positioning for digit stretches individual flexion/extension -STM to L forearm wrist flexors and extensors  -PROM stretching and joint mobilization for L wrist and digits  -AROM slow stretch for digits and thumb  -Tenodesis wrist positioning for digit stretches individual flexion/extension  -Yellow Theraputty Strengthening Exercises -STM to L forearm wrist flexors and extensors  -PROM stretching and joint mobilization for L wrist and digits  -AROM slow stretch for digits and thumb  -Tenodesis wrist positioning for digit stretches individual flexion/extension  -RUE STM to forearm wrist flexors/extensors and digit/wrist PROM  -Bilateral reaching LUE for cone stacking and bean bag sorting  -2.5 lb wrist bilateral reaching LUE for cone stacking and bean bag sorting  -Perfection [facility] Sorting Activity -STM to L forearm wrist flexors and extensors  -PROM stretching and joint mobilization for L wrist and digits  -AROM slow stretch for digits and thumb  -Tenodesis wrist positioning for digit stretches  individual flexion/extension  -RUE STM to forearm wrist flexors/extensors and digit/wrist PROM  -[employer] Sorting and coordination activity     HEP:   -AROM/AAROM Stretching Routine for Shoulders, Elbows, Wrists  -Tendon Gliding Exercises  -Thumb ROM Exercises  -Yellow Theraputty Strengthening Exercises    Charges: MTx1, TEx2       Total Timed Treatment: 40 min  Total Treatment Time: 40 min

## 2024-05-07 ENCOUNTER — OFFICE VISIT (OUTPATIENT)
Dept: OCCUPATIONAL MEDICINE | Facility: HOSPITAL | Age: 39
End: 2024-05-07
Attending: FAMILY MEDICINE
Payer: COMMERCIAL

## 2024-05-07 PROCEDURE — 97110 THERAPEUTIC EXERCISES: CPT

## 2024-05-07 PROCEDURE — 97140 MANUAL THERAPY 1/> REGIONS: CPT

## 2024-05-07 NOTE — PROGRESS NOTES
Diagnosis:   Cerebral palsy (HCC) (G80.9)          Referring Provider: Bhaskar Lacy  Date of Evaluation:    4/12/2024    Precautions:  Cerebral Palsy Next MD visit:   none scheduled  Date of Surgery: n/a   Insurance Primary/Secondary: BCBS POS       # Auth Visits: 7/12            Subjective: Patient presents to OT noting muscle spasms still occurring but frequency has decreased at this time. Completing exercises at home. Denies any pain today.    Objective:     AROM (degrees): Patient presents with flexed positioning of bilateral digits. Shoulder, elbow, and wrist movements WFL with compensatory strategies and tendency towards D2 extension positioning to extend digits.     MANUAL MUSCLE TESTING:   Shoulder Flexion: R=NT; L=4/5  Shoulder Abduction: R=NT; L=4/5  Shoulder External Rotation: R=NT; L=4/5  Shoulder Internal Rotation: R=NT; L=4/5  Elbow Flexion: R=NT; L=4+/5  Elbow Extension: R=NT; L=4/5  Forearm Pronation: R=NT; L=4/5  Forearm Supination: R=NT; L=2+/5  Wrist Flexion: R=NT; L=4+/5  Wrist Extension: R=NT; L=4/5     Strength (lbs) Right Average Left Average   : 4.0 lbs 16.4 lbs   2 pt Pinch: NT NT   3 pt Pinch: NT NT   Lateral Pinch: NT NT       Assessment: Muscle spasms still present to LUE overall mostly triggered by wrist and hand stretching and movement. Manual therapy, STM, and stretching provided today to assist in alleviating symptoms. No significant changes in muscle spasm frequency at this time. Patient appears to be responding well to therapy although difficult to fully gauge as patient notes history of muscle spasms to LUE even before becoming sick at the end of last year.      Goals: (to be met in 12 visits)   1. Patient will be independent with daily LUE stretching routine indicating increased ability to manage functional ADL/IADLs  2. Patient will report infrequent incidence of LUE muscle spasms daily <15 indicating increased management of symptoms.  3. Patient will demonstrate at least  20.0 lbs or greater L  strength indicating increased ability to manage functional daily tasks.  4. Patient will be independent and compliant with comprehensive HEP to maintain progress achieved in OT.     Plan: Patient will continue to be seen for 2x/week or a total of 12 visits over a 90 day period.  Treatment will include: Manual Therapy, Neuromuscular Re-education, Self-Care Home Management, Therapeutic Activities, Therapeutic Exercise, and Home Exercise Program instruction, Splinting, Modalities PRN    Date: 4/23/2024            TX#: 3/12 Date: 4/25/2024             TX#: 4/12 Date: 4/30/2024             TX#: 5/12 Date: 5/3/2024  TX#: 6/12 Date: 5/7/2024  TX#: 7/12   -STM to L forearm wrist flexors and extensors  -PROM stretching and joint mobilization for L wrist and digits  -AROM slow stretch for digits and thumb  -Tenodesis wrist positioning for digit stretches individual flexion/extension -STM to L forearm wrist flexors and extensors  -PROM stretching and joint mobilization for L wrist and digits  -AROM slow stretch for digits and thumb  -Tenodesis wrist positioning for digit stretches individual flexion/extension  -Yellow Theraputty Strengthening Exercises -STM to L forearm wrist flexors and extensors  -PROM stretching and joint mobilization for L wrist and digits  -AROM slow stretch for digits and thumb  -Tenodesis wrist positioning for digit stretches individual flexion/extension  -RUE STM to forearm wrist flexors/extensors and digit/wrist PROM  -Bilateral reaching LUE for cone stacking and bean bag sorting  -2.5 lb wrist bilateral reaching LUE for cone stacking and bean bag sorting  -Perfection [facility] Sorting Activity -STM to L forearm wrist flexors and extensors  -PROM stretching and joint mobilization for L wrist and digits  -AROM slow stretch for digits and thumb  -Tenodesis wrist positioning for digit stretches individual flexion/extension  -RUE STM to forearm wrist flexors/extensors and digit/wrist  PROM  -[employer] [facility] Sorting and coordination activity -STM to L shoulder deltoid, biceps, triceps  -STM to forearm wrist flexors/extensors  -PROM stretching digits MCPS/PIPs/DIPs and thumbs MP/IP joints  -Joint Mobilization LUE digits and wrist  -Shoulder Flexion, Shoulder Abduction, ER, IR 1 set of 5 reps each  -Elbow Flexion/Extension, Forearm Pronation/Supination, Wrist Flexion/Extension 1 set of 5 reps each  -Yellow Theraputty Strengthening Exercises for full , opposition pinch, small ball formation  -Perfection [facility] and sorting activity  -Spot It! LUE coordination activity     HEP:   -AROM/AAROM Stretching Routine for Shoulders, Elbows, Wrists  -Tendon Gliding Exercises  -Thumb ROM Exercises  -Yellow Theraputty Strengthening Exercises    Charges: MTx1, TEx2       Total Timed Treatment: 40 min  Total Treatment Time: 40 min

## 2024-05-09 ENCOUNTER — OFFICE VISIT (OUTPATIENT)
Dept: OCCUPATIONAL MEDICINE | Facility: HOSPITAL | Age: 39
End: 2024-05-09
Attending: FAMILY MEDICINE
Payer: COMMERCIAL

## 2024-05-09 PROCEDURE — 97140 MANUAL THERAPY 1/> REGIONS: CPT

## 2024-05-09 PROCEDURE — 97110 THERAPEUTIC EXERCISES: CPT

## 2024-05-09 NOTE — PROGRESS NOTES
Diagnosis:   Cerebral palsy (HCC) (G80.9)          Referring Provider: Bhaskar Lacy  Date of Evaluation:    4/12/2024    Precautions:  Cerebral Palsy Next MD visit:   none scheduled  Date of Surgery: n/a   Insurance Primary/Secondary: BCBS POS       # Auth Visits: 8/12            Subjective: Patient presents to OT appt today noting she has been completing stretches and feels muscle spasms for LUE more controlled. Will be following up with Dr. Alfaro tomorrow.    Objective:     AROM (degrees): Patient presents with flexed positioning of bilateral digits. Shoulder, elbow, and wrist movements WFL with compensatory strategies and tendency towards D2 extension positioning to extend digits.     MANUAL MUSCLE TESTING:   Shoulder Flexion: R=NT; L=4/5  Shoulder Abduction: R=NT; L=4/5  Shoulder External Rotation: R=NT; L=4/5  Shoulder Internal Rotation: R=NT; L=4/5  Elbow Flexion: R=NT; L=4+/5  Elbow Extension: R=NT; L=4/5  Forearm Pronation: R=NT; L=4/5  Forearm Supination: R=NT; L=2+/5  Wrist Flexion: R=NT; L=4+/5  Wrist Extension: R=NT; L=4/5     Strength (lbs) Right Average Left Average   : 4.0 lbs 16.4 lbs   2 pt Pinch: NT NT   3 pt Pinch: NT NT   Lateral Pinch: NT NT       Assessment: Muscle spasms still present to LUE overall mostly triggered by wrist and hand stretching and movement. Manual therapy, STM, and stretching provided today to assist in alleviating symptoms. No significant changes in muscle spasm frequency at this time. Patient appears to be responding well to therapy although difficult to fully gauge as patient notes history of muscle spasms to LUE even before becoming sick at the end of last year.      Goals: (to be met in 12 visits)   1. Patient will be independent with daily LUE stretching routine indicating increased ability to manage functional ADL/IADLs  2. Patient will report infrequent incidence of LUE muscle spasms daily <15 indicating increased management of symptoms.  3. Patient will  demonstrate at least 20.0 lbs or greater L  strength indicating increased ability to manage functional daily tasks.  4. Patient will be independent and compliant with comprehensive HEP to maintain progress achieved in OT.     Plan: Patient will continue to be seen for 2x/week or a total of 12 visits over a 90 day period.  Treatment will include: Manual Therapy, Neuromuscular Re-education, Self-Care Home Management, Therapeutic Activities, Therapeutic Exercise, and Home Exercise Program instruction, Splinting, Modalities PRN    Date: 4/25/2024             TX#: 4/12 Date: 4/30/2024             TX#: 5/12 Date: 5/3/2024  TX#: 6/12 Date: 5/7/2024  TX#: 7/12 Date: 5/9/2024  TX#: 8/12   -STM to L forearm wrist flexors and extensors  -PROM stretching and joint mobilization for L wrist and digits  -AROM slow stretch for digits and thumb  -Tenodesis wrist positioning for digit stretches individual flexion/extension  -Yellow Theraputty Strengthening Exercises -STM to L forearm wrist flexors and extensors  -PROM stretching and joint mobilization for L wrist and digits  -AROM slow stretch for digits and thumb  -Tenodesis wrist positioning for digit stretches individual flexion/extension  -RUE STM to forearm wrist flexors/extensors and digit/wrist PROM  -Bilateral reaching LUE for cone stacking and bean bag sorting  -2.5 lb wrist bilateral reaching LUE for cone stacking and bean bag sorting  -Perfection [facility] Sorting Activity -STM to L forearm wrist flexors and extensors  -PROM stretching and joint mobilization for L wrist and digits  -AROM slow stretch for digits and thumb  -Tenodesis wrist positioning for digit stretches individual flexion/extension  -RUE STM to forearm wrist flexors/extensors and digit/wrist PROM  -[employer] Sorting and coordination activity -STM to L shoulder deltoid, biceps, triceps  -STM to forearm wrist flexors/extensors  -PROM stretching digits MCPS/PIPs/DIPs and thumbs MP/IP joints  -Joint  Mobilization LUE digits and wrist  -Shoulder Flexion, Shoulder Abduction, ER, IR 1 set of 5 reps each  -Elbow Flexion/Extension, Forearm Pronation/Supination, Wrist Flexion/Extension 1 set of 5 reps each  -Yellow Theraputty Strengthening Exercises for full , opposition pinch, small ball formation  -Perfection [facility] and sorting activity  -Spot It! LUE coordination activity -STM to L shoulder deltoid, biceps, triceps  -STM to forearm wrist flexors/extensors  -PROM stretching digits MCPS/PIPs/DIPs and thumbs MP/IP joints  -Joint Mobilization LUE digits and wrist  -Shoulder Flexion, Shoulder Abduction, ER, IR 1 set of 5 reps each  -Elbow Flexion/Extension, Forearm Pronation/Supination, Wrist Flexion/Extension 1 set of 5 reps each  -[employer] FMC and sorting LUE activity  -Spot It! LUE coordination activity     HEP:   -AROM/AAROM Stretching Routine for Shoulders, Elbows, Wrists  -Tendon Gliding Exercises  -Thumb ROM Exercises  -Yellow Theraputty Strengthening Exercises    Charges: MTx1, TEx2       Total Timed Treatment: 40 min  Total Treatment Time: 40 min

## 2024-05-10 ENCOUNTER — OFFICE VISIT (OUTPATIENT)
Dept: NEUROLOGY | Facility: CLINIC | Age: 39
End: 2024-05-10
Payer: COMMERCIAL

## 2024-05-10 ENCOUNTER — APPOINTMENT (OUTPATIENT)
Dept: OCCUPATIONAL MEDICINE | Facility: HOSPITAL | Age: 39
End: 2024-05-10
Payer: COMMERCIAL

## 2024-05-10 VITALS
RESPIRATION RATE: 16 BRPM | HEART RATE: 89 BPM | WEIGHT: 120 LBS | OXYGEN SATURATION: 97 % | BODY MASS INDEX: 23 KG/M2 | DIASTOLIC BLOOD PRESSURE: 74 MMHG | SYSTOLIC BLOOD PRESSURE: 120 MMHG

## 2024-05-10 DIAGNOSIS — G51.39 FACIAL SPASM: ICD-10-CM

## 2024-05-10 DIAGNOSIS — M26.609 TMJ DYSFUNCTION: ICD-10-CM

## 2024-05-10 DIAGNOSIS — G82.50 SPASTIC QUADRIPLEGIA (HCC): Primary | ICD-10-CM

## 2024-05-10 PROCEDURE — 3078F DIAST BP <80 MM HG: CPT | Performed by: OTHER

## 2024-05-10 PROCEDURE — 3074F SYST BP LT 130 MM HG: CPT | Performed by: OTHER

## 2024-05-10 PROCEDURE — 99213 OFFICE O/P EST LOW 20 MIN: CPT | Performed by: OTHER

## 2024-05-10 RX ORDER — BACLOFEN 10 MG/1
10 TABLET ORAL 3 TIMES DAILY
COMMUNITY
Start: 2024-04-23

## 2024-05-10 NOTE — PROGRESS NOTES
HPI:    Patient ID: Jaye Jones is a 39 year old female.  PCP: Dr Chang    Neurologic Problem        Patient presents for follow-up for jaw/facial spasm and possible TMJ.  States her symptoms have mildly improved, tried baclofen 5 mg TID that I prescribed and noted mild benefit. She then saw physiatrist and suggested to increase the dose of baclofen to 10 mg 3 times a day that is working well.  He also recommended occupational therapy.  She also saw dentist for TMJ and oral brace was recommended but due to drooling didn't started.     Initial history  Jaye Jones is a 39 year old female with history of cerebral palsy and spastic quadriplegia who presents for evaluation of jaw/facial twitching. She is here along with her father who reports that Jaye had URI around [place] and since then have chronic cough and during coughing jaw twitches and also she has noticed some right facial spasm for a few seconds. No facial or jaw pain  She has baseline facial asymmetry and drooling.      Father reports she was born full term by C section and shortly after birth aspirated and coded, placed on ventilator and had seizures. She was treated with seizure medication until 2 year of age and remains seizure free that. She was spastic quadriplegia right side more weak than left. She is able to speak and feeds herself. She follows with Physiatry at [facility].      HISTORY:  Past Medical History:    Hordeolum internum    Impacted cerumen    Syncope and collapse      Past Surgical History:   Procedure Laterality Date    Anesth,hip joint surgery  1/1/90    bilateral metal plates    Other  1/1/90    femoral osteoplasty lengthening    Other surgical history      lengthening of hamstring tendon multiple, both legs    Other surgical history  1/1/88    rhizotomy      Family History   Problem Relation Age of Onset    Other (SIDS [Other]) Sister       Social History     Socioeconomic History    Marital status: Single   Tobacco Use     Smoking status: Never     Passive exposure: Never    Smokeless tobacco: Never   Vaping Use    Vaping status: Never Used   Substance and Sexual Activity    Alcohol use: No    Drug use: No   Other Topics Concern    Caffeine Concern No    Exercise Yes     Comment: OP        Review of Systems   Constitutional: Negative.    HENT:  Positive for drooling.    Eyes: Negative.    Respiratory: Negative.     Cardiovascular: Negative.    Gastrointestinal: Negative.    Endocrine: Negative.    Genitourinary: Negative.    Musculoskeletal:  Positive for gait problem. Negative for neck stiffness.   Skin: Negative.    Allergic/Immunologic: Negative.    Neurological:  Positive for facial asymmetry and speech difficulty.        Spastic quadriplegia   Hematological: Negative.    Psychiatric/Behavioral: Negative.     All other systems reviewed and are negative.           Current Outpatient Medications   Medication Sig Dispense Refill    baclofen 10 MG Oral Tab Take 1 tablet (10 mg total) by mouth 3 (three) times daily.      potassium chloride 20 MEQ Oral Powd Pack Take 20 mEq by mouth 2 (two) times daily. Dad states a spoon full      loratadine 10 MG Oral Tab Take 1 tablet (10 mg total) by mouth daily.       Allergies:No Known Allergies  PHYSICAL EXAM:   Physical Exam  Blood pressure 120/74, pulse 89, resp. rate 16, weight 120 lb (54.4 kg), SpO2 97%.      General Appearance: Sitting in wheel chair, no apparent distress   HEENT: Normocephalic and atraumatic.   Neck: slight right torticollis  Cardiovascular: Normal rate, regular rhythm and normal heart sounds.    Pulmonary/Chest: Effort normal and breath sounds normal.   Abdominal: Soft. Bowel sounds are normal.   Psych: normal mood and affect    Neurological:  Patient is awake, alert and oriented to person, place and time . Speech is fluent but very dysarthric, able to follows simple commands      Cranial Nerves:   II: Visual acuity: normal  III: Pupils: equal, round, reactive to  light  III,IV,VI: mild dysconjugate gaze  V: Facial sensation: intact  VII: Facial strength: facial asymmetry at baseline  VIII: Hearing: intact  IX: Palate: intact  XI: Shoulder shrug: intact  XII: Tongue movement: normal    Motor : Spastic quadriplegia right>left, contracted right hand     Sensory: Sensory examination is normal to light touch and pinprick     Coordination: impaired.    Gait: wheel chair bound        ASSESSMENT/PLAN:       ICD-10-CM    1. Spastic quadriplegia (HCC)  G82.50       2. Facial spasm  G51.39       3. TMJ dysfunction  M26.609                Facial/jaw twitching spasm, likely related to cerebral palsy and TMJ dysfunction.       Started Baclofen for spasticity, continue 10 mg TID  Continue to management for TMJ  Patient follows with Physiatry  Call the clinic if she needs any order for physical therapy      Joselito Alfaro MD  [facility]      Meds This Visit:  Requested Prescriptions      No prescriptions requested or ordered in this encounter       Imaging & Referrals:  None     ID#[id number]

## 2024-05-10 NOTE — PROGRESS NOTES
Pt- states follow up on facial twitch, a slight improvement. Had spasm on hands, increase baclofen to 10 mg.

## 2024-05-10 NOTE — PATIENT INSTRUCTIONS
Refill policies:    Allow 2-3 business days for refills; controlled substances may take longer.  Contact your pharmacy at least 5 days prior to running out of medication and have them send an electronic request or submit request through the “request refill” option in your [employer] account.  Refills are not addressed on weekends; covering physicians do not authorize routine medications on weekends.  No narcotics or controlled substances are refilled after noon on Fridays or by on call physicians.  By law, narcotics must be electronically prescribed.  A 30 day supply with no refills is the maximum allowed.  If your prescription is due for a refill, you may be due for a follow up appointment.  To best provide you care, patients receiving routine medications need to be seen at least once a year.  Patients receiving narcotic/controlled substance medications need to be seen at least once every 3 months.  In the event that your preferred pharmacy does not have the requested medication in stock (e.g. Backordered), it is your responsibility to find another pharmacy that has the requested medication available.  We will gladly send a new prescription to that pharmacy at your request.    Scheduling Tests:    If your physician has ordered radiology tests such as MRI or CT scans, please contact Central Scheduling at [phone] right away to schedule the test.  Once scheduled, the [facility] Centralized Referral Team will work with your insurance carrier to obtain pre-certification or prior authorization.  Depending on your insurance carrier, approval may take 3-10 days.  It is highly recommended patients assure they have received an authorization before having a test performed.  If test is done without insurance authorization, patient may be responsible for the entire amount billed.      Precertification and Prior Authorizations:  If your physician has recommended that you have a procedure or additional testing performed the [facility]  Centralized Referral Team will contact your insurance carrier to obtain pre-certification or prior authorization.    You are strongly encouraged to contact your insurance carrier to verify that your procedure/test has been approved and is a COVERED benefit.  Although the [facility] Centralized Referral Team does its due diligence, the insurance carrier gives the disclaimer that \"Although the procedure is authorized, this does not guarantee payment.\"    Ultimately the patient is responsible for payment.   Thank you for your understanding in this matter.  Paperwork Completion:  If you require FMLA or disability paperwork for your recovery, please make sure to either drop it off or have it faxed to our office at [phone]. Be sure the form has your name and date of birth on it.  The form will be faxed to our Forms Department and they will complete it for you.  There is a 25$ fee for all forms that need to be filled out.  Please be aware there is a 10-14 day turnaround time.  You will need to sign a release of information (NISSA) form if your paperwork does not come with one.  You may call the Forms Department with any questions at [phone].  Their fax number is [phone].

## 2024-05-14 ENCOUNTER — OFFICE VISIT (OUTPATIENT)
Dept: OCCUPATIONAL MEDICINE | Facility: HOSPITAL | Age: 39
End: 2024-05-14
Payer: COMMERCIAL

## 2024-05-14 PROCEDURE — 97110 THERAPEUTIC EXERCISES: CPT

## 2024-05-14 PROCEDURE — 97140 MANUAL THERAPY 1/> REGIONS: CPT

## 2024-05-14 NOTE — PROGRESS NOTES
Diagnosis:   Cerebral palsy (HCC) (G80.9)          Referring Provider: Bhaskar Lacy  Date of Evaluation:    4/12/2024    Precautions:  Cerebral Palsy Next MD visit:   none scheduled  Date of Surgery: n/a   Insurance Primary/Secondary: BCBS POS       # Auth Visits: 9/12            Subjective: Patient presents to OT appt today noting muscle spasm frequency for LUE has decreased and saw Dr. Alfaro last week who reported to continue exercises. Patient notes feeling well overall and denies any pain today.    Objective:     AROM (degrees): Patient presents with flexed positioning of bilateral digits. Shoulder, elbow, and wrist movements WFL with compensatory strategies and tendency towards D2 extension positioning to extend digits.     MANUAL MUSCLE TESTING:   Shoulder Flexion: R=NT; L=4/5  Shoulder Abduction: R=NT; L=4/5  Shoulder External Rotation: R=NT; L=4/5  Shoulder Internal Rotation: R=NT; L=4/5  Elbow Flexion: R=NT; L=4+/5  Elbow Extension: R=NT; L=4/5  Forearm Pronation: R=NT; L=4/5  Forearm Supination: R=NT; L=2+/5  Wrist Flexion: R=NT; L=4+/5  Wrist Extension: R=NT; L=4/5     Strength (lbs) Right Average Left Average   : 4.0 lbs 16.4 lbs   2 pt Pinch: NT NT   3 pt Pinch: NT NT   Lateral Pinch: NT NT       Assessment: Muscle spasms still present to LUE overall mostly triggered by wrist and hand stretching and movement. Manual therapy, STM, and stretching provided today to assist in alleviating symptoms. No significant changes in muscle spasm frequency at this time. Patient appears to be responding well to therapy although difficult to fully gauge as patient notes history of muscle spasms to LUE even before becoming sick at the end of last year.      Goals: (to be met in 12 visits)   1. Patient will be independent with daily LUE stretching routine indicating increased ability to manage functional ADL/IADLs  2. Patient will report infrequent incidence of LUE muscle spasms daily <15 indicating increased  management of symptoms.  3. Patient will demonstrate at least 20.0 lbs or greater L  strength indicating increased ability to manage functional daily tasks.  4. Patient will be independent and compliant with comprehensive HEP to maintain progress achieved in OT.     Plan: Patient will continue to be seen for 2x/week or a total of 12 visits over a 90 day period.  Treatment will include: Manual Therapy, Neuromuscular Re-education, Self-Care Home Management, Therapeutic Activities, Therapeutic Exercise, and Home Exercise Program instruction, Splinting, Modalities PRN    Date: 4/25/2024             TX#: 4/12 Date: 4/30/2024             TX#: 5/12 Date: 5/3/2024  TX#: 6/12 Date: 5/7/2024  TX#: 7/12 Date: 5/9/2024  TX#: 8/12 Date: 5/14/2024  TX#: 9/12   -STM to L forearm wrist flexors and extensors  -PROM stretching and joint mobilization for L wrist and digits  -AROM slow stretch for digits and thumb  -Tenodesis wrist positioning for digit stretches individual flexion/extension  -Yellow Theraputty Strengthening Exercises -STM to L forearm wrist flexors and extensors  -PROM stretching and joint mobilization for L wrist and digits  -AROM slow stretch for digits and thumb  -Tenodesis wrist positioning for digit stretches individual flexion/extension  -RUE STM to forearm wrist flexors/extensors and digit/wrist PROM  -Bilateral reaching LUE for cone stacking and bean bag sorting  -2.5 lb wrist bilateral reaching LUE for cone stacking and bean bag sorting  -Perfection [facility] [place] Activity -STM to L forearm wrist flexors and extensors  -PROM stretching and joint mobilization for L wrist and digits  -AROM slow stretch for digits and thumb  -Tenodesis wrist positioning for digit stretches individual flexion/extension  -RUE STM to forearm wrist flexors/extensors and digit/wrist PROM  -[place] Sorting and coordination activity -STM to L shoulder deltoid, biceps, triceps  -STM to forearm wrist flexors/extensors  -PROM  stretching digits MCPS/PIPs/DIPs and thumbs MP/IP joints  -Joint Mobilization LUE digits and wrist  -Shoulder Flexion, Shoulder Abduction, ER, IR 1 set of 5 reps each  -Elbow Flexion/Extension, Forearm Pronation/Supination, Wrist Flexion/Extension 1 set of 5 reps each  -Yellow Theraputty Strengthening Exercises for full , opposition pinch, small ball formation  -Perfection FMC and sorting activity  -Spot It! LUE coordination activity -STM to L shoulder deltoid, biceps, triceps  -STM to forearm wrist flexors/extensors  -PROM stretching digits MCPS/PIPs/DIPs and thumbs MP/IP joints  -Joint Mobilization LUE digits and wrist  -Shoulder Flexion, Shoulder Abduction, ER, IR 1 set of 5 reps each  -Elbow Flexion/Extension, Forearm Pronation/Supination, Wrist Flexion/Extension 1 set of 5 reps each  -Peg Board FMC and sorting LUE activity  -Spot It! LUE coordination activity -STM to L shoulder deltoid, biceps, triceps  -STM to forearm wrist flexors/extensors  -PROM stretching digits MCPS/PIPs/DIPs and thumbs MP/IP joints  -Joint Mobilization LUE digits and wrist  -Shoulder Flexion, Shoulder Abduction, ER, IR 1 set of 5 reps each  -Elbow Flexion/Extension, Forearm Pronation/Supination, Wrist Flexion/Extension 1 set of 5 reps each  -Yellow Theraputty Strengthening Exercises for full , opposition pinch, small ball formation     HEP:   -AROM/AAROM Stretching Routine for Shoulders, Elbows, Wrists  -Tendon Gliding Exercises  -Thumb ROM Exercises  -Yellow Theraputty Strengthening Exercises    Charges: MTx1, TEx2       Total Timed Treatment: 40 min  Total Treatment Time: 40 min

## 2024-05-16 ENCOUNTER — OFFICE VISIT (OUTPATIENT)
Dept: OCCUPATIONAL MEDICINE | Facility: HOSPITAL | Age: 39
End: 2024-05-16
Payer: COMMERCIAL

## 2024-05-16 PROCEDURE — 97110 THERAPEUTIC EXERCISES: CPT

## 2024-05-16 PROCEDURE — 97140 MANUAL THERAPY 1/> REGIONS: CPT

## 2024-05-16 NOTE — PROGRESS NOTES
Diagnosis:   Cerebral palsy (HCC) (G80.9)          Referring Provider: Bhaskar Lacy  Date of Evaluation:    4/12/2024    Precautions:  Cerebral Palsy Next MD visit:   none scheduled  Date of Surgery: n/a   Insurance Primary/Secondary: BCBS POS       # Auth Visits: 10/12            Subjective: Patient reports no significant changes since last visit. Still completing stretches and exercises at home. Muscle spasms still present but more controlled for LUE in patient's opinion.    Objective:     AROM (degrees): Patient presents with flexed positioning of bilateral digits. Shoulder, elbow, and wrist movements WFL with compensatory strategies and tendency towards D2 extension positioning to extend digits.     MANUAL MUSCLE TESTING:   Shoulder Flexion: R=NT; L=4/5  Shoulder Abduction: R=NT; L=4/5  Shoulder External Rotation: R=NT; L=4/5  Shoulder Internal Rotation: R=NT; L=4/5  Elbow Flexion: R=NT; L=4+/5  Elbow Extension: R=NT; L=4/5  Forearm Pronation: R=NT; L=4/5  Forearm Supination: R=NT; L=2+/5  Wrist Flexion: R=NT; L=4+/5  Wrist Extension: R=NT; L=4/5     Strength (lbs) Right Average Left Average   : 4.0 lbs 16.4 lbs   2 pt Pinch: NT NT   3 pt Pinch: NT NT   Lateral Pinch: NT NT       Assessment: Muscle spasms still present to LUE overall mostly triggered by wrist and hand stretching and movement. Manual therapy, STM, and stretching provided today to assist in alleviating symptoms. No significant changes in muscle spasm frequency at this time. Patient appears to be responding well to therapy although difficult to fully gauge as patient notes history of muscle spasms to LUE even before becoming sick at the end of last year.      Goals: (to be met in 12 visits)   1. Patient will be independent with daily LUE stretching routine indicating increased ability to manage functional ADL/IADLs  2. Patient will report infrequent incidence of LUE muscle spasms daily <15 indicating increased management of symptoms.  3.  Patient will demonstrate at least 20.0 lbs or greater L  strength indicating increased ability to manage functional daily tasks.  4. Patient will be independent and compliant with comprehensive HEP to maintain progress achieved in OT.     Plan: Patient will continue to be seen for 2x/week or a total of 12 visits over a 90 day period.  Treatment will include: Manual Therapy, Neuromuscular Re-education, Self-Care Home Management, Therapeutic Activities, Therapeutic Exercise, and Home Exercise Program instruction, Splinting, Modalities PRN    Date: 4/30/2024             TX#: 5/12 Date: 5/3/2024  TX#: 6/12 Date: 5/7/2024  TX#: 7/12 Date: 5/9/2024  TX#: 8/12 Date: 5/14/2024  TX#: 9/12 Date: 5/16/2024  TX#: 10/12   -STM to L forearm wrist flexors and extensors  -PROM stretching and joint mobilization for L wrist and digits  -AROM slow stretch for digits and thumb  -Tenodesis wrist positioning for digit stretches individual flexion/extension  -RUE STM to forearm wrist flexors/extensors and digit/wrist PROM  -Bilateral reaching LUE for cone stacking and bean bag sorting  -2.5 lb wrist bilateral reaching LUE for cone stacking and bean bag sorting  -Perfection [facility] Sorting Activity -STM to L forearm wrist flexors and extensors  -PROM stretching and joint mobilization for L wrist and digits  -AROM slow stretch for digits and thumb  -Tenodesis wrist positioning for digit stretches individual flexion/extension  -RUE STM to forearm wrist flexors/extensors and digit/wrist PROM  -[place] Sorting and coordination activity -STM to L shoulder deltoid, biceps, triceps  -STM to forearm wrist flexors/extensors  -PROM stretching digits MCPS/PIPs/DIPs and thumbs MP/IP joints  -Joint Mobilization LUE digits and wrist  -Shoulder Flexion, Shoulder Abduction, ER, IR 1 set of 5 reps each  -Elbow Flexion/Extension, Forearm Pronation/Supination, Wrist Flexion/Extension 1 set of 5 reps each  -Yellow Theraputty Strengthening Exercises for  full , opposition pinch, small ball formation  -Perfection FMC and sorting activity  -Spot It! LUE coordination activity -STM to L shoulder deltoid, biceps, triceps  -STM to forearm wrist flexors/extensors  -PROM stretching digits MCPS/PIPs/DIPs and thumbs MP/IP joints  -Joint Mobilization LUE digits and wrist  -Shoulder Flexion, Shoulder Abduction, ER, IR 1 set of 5 reps each  -Elbow Flexion/Extension, Forearm Pronation/Supination, Wrist Flexion/Extension 1 set of 5 reps each  -Peg Board FMC and sorting LUE activity  -Spot It! LUE coordination activity -STM to L shoulder deltoid, biceps, triceps  -STM to forearm wrist flexors/extensors  -PROM stretching digits MCPS/PIPs/DIPs and thumbs MP/IP joints  -Joint Mobilization LUE digits and wrist  -Shoulder Flexion, Shoulder Abduction, ER, IR 1 set of 5 reps each  -Elbow Flexion/Extension, Forearm Pronation/Supination, Wrist Flexion/Extension 1 set of 5 reps each  -Yellow Theraputty Strengthening Exercises for full , opposition pinch, small ball formation -STM to L shoulder deltoid, biceps, triceps  -STM to forearm wrist flexors/extensors  -PROM stretching digits MCPS/PIPs/DIPs and thumbs MP/IP joints  -Joint Mobilization LUE digits and wrist  -Shoulder Flexion, Shoulder Abduction, ER, IR 1 set of 5 reps each  -Elbow Flexion/Extension, Forearm Pronation/Supination, Wrist Flexion/Extension 1 set of 5 reps each  -Yellow Theraputty Strengthening Exercises for full , opposition pinch, small ball formation     HEP:   -AROM/AAROM Stretching Routine for Shoulders, Elbows, Wrists  -Tendon Gliding Exercises  -Thumb ROM Exercises  -Yellow Theraputty Strengthening Exercises    Charges: MTx1, TEx2       Total Timed Treatment: 40 min  Total Treatment Time: 40 min

## 2024-05-21 ENCOUNTER — OFFICE VISIT (OUTPATIENT)
Dept: OCCUPATIONAL MEDICINE | Facility: HOSPITAL | Age: 39
End: 2024-05-21
Payer: COMMERCIAL

## 2024-05-21 PROCEDURE — 97140 MANUAL THERAPY 1/> REGIONS: CPT

## 2024-05-21 PROCEDURE — 97110 THERAPEUTIC EXERCISES: CPT

## 2024-05-21 NOTE — PROGRESS NOTES
Diagnosis:   Cerebral palsy (HCC) (G80.9)          Referring Provider: Bhaskar Lacy  Date of Evaluation:    4/12/2024    Precautions:  Cerebral Palsy Next MD visit:   none scheduled  Date of Surgery: n/a   Insurance Primary/Secondary: BCBS POS       # Auth Visits: 11/12            Subjective: Patient reports muscle spasm frequency to LUE decreasing but still present. Completing exercises for ROM stretching at home. Denies pain today.    Objective:     AROM (degrees): Patient presents with flexed positioning of bilateral digits. Shoulder, elbow, and wrist movements WFL with compensatory strategies and tendency towards D2 extension positioning to extend digits.     MANUAL MUSCLE TESTING:   Shoulder Flexion: R=NT; L=4/5  Shoulder Abduction: R=NT; L=4/5  Shoulder External Rotation: R=NT; L=4/5  Shoulder Internal Rotation: R=NT; L=4/5  Elbow Flexion: R=NT; L=4+/5  Elbow Extension: R=NT; L=4/5  Forearm Pronation: R=NT; L=4/5  Forearm Supination: R=NT; L=2+/5  Wrist Flexion: R=NT; L=4+/5  Wrist Extension: R=NT; L=4/5     Strength (lbs) Right Average Left Average   : 4.0 lbs 16.4 lbs   2 pt Pinch: NT NT   3 pt Pinch: NT NT   Lateral Pinch: NT NT       Assessment: Management for LUE muscle spasms including STM and ROM exercises alongside graded LUE functional activities and light strengthening to L hand and wrist. Patient muscle spasm frequency appears to be decreasing but difficult to assess and patient reports chronic muscle spasms even before illness in late 2023 that increased frequency. Regardless, patient set up with HEP and would be appropriate for 1 additional visit prior to discharge and continuation with HEP.      Goals: (to be met in 12 visits)   1. Patient will be independent with daily LUE stretching routine indicating increased ability to manage functional ADL/IADLs  2. Patient will report infrequent incidence of LUE muscle spasms daily <15 indicating increased management of symptoms.  3. Patient will  demonstrate at least 20.0 lbs or greater L  strength indicating increased ability to manage functional daily tasks.  4. Patient will be independent and compliant with comprehensive HEP to maintain progress achieved in OT.     Plan: Patient will continue to be seen for 2x/week or a total of 12 visits over a 90 day period.  Treatment will include: Manual Therapy, Neuromuscular Re-education, Self-Care Home Management, Therapeutic Activities, Therapeutic Exercise, and Home Exercise Program instruction, Splinting, Modalities PRN    Date: 5/7/2024  TX#: 7/12 Date: 5/9/2024  TX#: 8/12 Date: 5/14/2024  TX#: 9/12 Date: 5/16/2024  TX#: 10/12 Date: 5/21/2024  TX#: 11/12   -STM to L shoulder deltoid, biceps, triceps  -STM to forearm wrist flexors/extensors  -PROM stretching digits MCPS/PIPs/DIPs and thumbs MP/IP joints  -Joint Mobilization LUE digits and wrist  -Shoulder Flexion, Shoulder Abduction, ER, IR 1 set of 5 reps each  -Elbow Flexion/Extension, Forearm Pronation/Supination, Wrist Flexion/Extension 1 set of 5 reps each  -Yellow Theraputty Strengthening Exercises for full , opposition pinch, small ball formation  -Perfection FMC and sorting activity  -Spot It! LUE coordination activity -STM to L shoulder deltoid, biceps, triceps  -STM to forearm wrist flexors/extensors  -PROM stretching digits MCPS/PIPs/DIPs and thumbs MP/IP joints  -Joint Mobilization LUE digits and wrist  -Shoulder Flexion, Shoulder Abduction, ER, IR 1 set of 5 reps each  -Elbow Flexion/Extension, Forearm Pronation/Supination, Wrist Flexion/Extension 1 set of 5 reps each  -Peg Board FMC and sorting LUE activity  -Spot It! LUE coordination activity -STM to L shoulder deltoid, biceps, triceps  -STM to forearm wrist flexors/extensors  -PROM stretching digits MCPS/PIPs/DIPs and thumbs MP/IP joints  -Joint Mobilization LUE digits and wrist  -Shoulder Flexion, Shoulder Abduction, ER, IR 1 set of 5 reps each  -Elbow Flexion/Extension, Forearm  Pronation/Supination, Wrist Flexion/Extension 1 set of 5 reps each  -Yellow Theraputty Strengthening Exercises for full , opposition pinch, small ball formation -STM to L shoulder deltoid, biceps, triceps  -STM to forearm wrist flexors/extensors  -PROM stretching digits MCPS/PIPs/DIPs and thumbs MP/IP joints  -Joint Mobilization LUE digits and wrist  -Shoulder Flexion, Shoulder Abduction, ER, IR 1 set of 5 reps each  -Elbow Flexion/Extension, Forearm Pronation/Supination, Wrist Flexion/Extension 1 set of 5 reps each  -Yellow Theraputty Strengthening Exercises for full , opposition pinch, small ball formation -STM to L shoulder deltoid, biceps, triceps  -STM to forearm wrist flexors/extensors  -PROM stretching digits MCPS/PIPs/DIPs and thumbs MP/IP joints  -Joint Mobilization LUE digits and wrist  -Shoulder Flexion, Shoulder Abduction, ER, IR 1 set of 5 reps each  -Elbow Flexion/Extension, Forearm Pronation/Supination, Wrist Flexion/Extension 1 set of 5 reps each  -Yellow Theraputty Strengthening Exercises for full , opposition pinch, small ball formation  -[address] [place] Sorting activity LUE     HEP:   -AROM/AAROM Stretching Routine for Shoulders, Elbows, Wrists  -Tendon Gliding Exercises  -Thumb ROM Exercises  -Yellow Theraputty Strengthening Exercises    Charges: MTx2, TEx1       Total Timed Treatment: 40 min  Total Treatment Time: 40 min

## 2024-05-23 ENCOUNTER — OFFICE VISIT (OUTPATIENT)
Dept: OCCUPATIONAL MEDICINE | Facility: HOSPITAL | Age: 39
End: 2024-05-23
Payer: COMMERCIAL

## 2024-05-23 PROCEDURE — 97140 MANUAL THERAPY 1/> REGIONS: CPT

## 2024-05-23 NOTE — PROGRESS NOTES
Discharge Summary  Diagnosis:   Cerebral palsy (HCC) (G80.9)          Referring Provider: Bhaskar Lacy  Date of Evaluation:    4/12/2024    Precautions:  Cerebral Palsy Next MD visit:   none scheduled  Date of Surgery: n/a   Insurance Primary/Secondary: BCBS POS       # Auth Visits: 12/12     Pt has attended 12 visits in Occupational Therapy.     Subjective: Patient presents to OT appt today noting she has been completing home exercises. Notes LUE muscle spasms still occur but feels they are more controlled at this time. LUE muscle spasms were present prior to illness in late 2023 due related to CP.    Assessment: Patient progressing from a therapy standpoint with focus on decreasing frequency and increasing LUE muscle spasm management. Difficult to gauge improvement due to underlying CP with baseline muscle spasms although patient notes frequency of spasms has decreased since beginning OT. Patient equipped with HEP exercises for LUE stretching and graded strengthening for LUE hand with theraputty. Patient to continue at home and follow-up with physician later this year. Patient appropriate to discharge from formal OT at this time and continue with HEP.    Objective:     AROM (degrees): Patient presents with flexed positioning of bilateral digits. Shoulder, elbow, and wrist movements WFL with compensatory strategies and tendency towards D2 extension positioning to extend digits.     MANUAL MUSCLE TESTING:   Shoulder Flexion: R=NT; L=4/5  Shoulder Abduction: R=NT; L=4/5  Shoulder External Rotation: R=NT; L=4/5  Shoulder Internal Rotation: R=NT; L=4/5  Elbow Flexion: R=NT; L=4+/5  Elbow Extension: R=NT; L=4/5  Forearm Pronation: R=NT; L=4/5  Forearm Supination: R=NT; L=2+/5  Wrist Flexion: R=NT; L=4+/5  Wrist Extension: R=NT; L=4/5     Strength (lbs) Right Average Left Average   : 4.0 lbs 16.4 lbs   2 pt Pinch: NT NT   3 pt Pinch: NT NT   Lateral Pinch: NT NT       Goals: (to be met in 12 visits)   1. Patient  will be independent with daily LUE stretching routine indicating increased ability to manage functional ADL/IADLs. MET  2. Patient will report infrequent incidence of LUE muscle spasms daily <15 indicating increased management of symptoms. MET  3. Patient will demonstrate at least 20.0 lbs or greater L  strength indicating increased ability to manage functional daily tasks. DISCONTINUE  4. Patient will be independent and compliant with comprehensive HEP to maintain progress achieved in OT. MET    Plan: Discharge from formal OT at this time and continue with established HEP and recommendations.    Patient/Family/Caregiver was advised of these findings, precautions, and treatment options and has agreed to actively participate in planning and for this course of care.    Thank you for your referral. If you have any questions, please contact me at Dept: [phone].    Sincerely,  Electronically signed by therapist: Erick Mariano, OT      Certification From: 5/23/2024  To:8/21/2024      Date: 5/9/2024  TX#: 8/12 Date: 5/14/2024  TX#: 9/12 Date: 5/16/2024  TX#: 10/12 Date: 5/21/2024  TX#: 11/12 Date: 5/23/2024  TX#: 12/12   -STM to L shoulder deltoid, biceps, triceps  -STM to forearm wrist flexors/extensors  -PROM stretching digits MCPS/PIPs/DIPs and thumbs MP/IP joints  -Joint Mobilization LUE digits and wrist  -Shoulder Flexion, Shoulder Abduction, ER, IR 1 set of 5 reps each  -Elbow Flexion/Extension, Forearm Pronation/Supination, Wrist Flexion/Extension 1 set of 5 reps each  -Peg Board FMC and sorting LUE activity  -Spot It! LUE coordination activity -STM to L shoulder deltoid, biceps, triceps  -STM to forearm wrist flexors/extensors  -PROM stretching digits MCPS/PIPs/DIPs and thumbs MP/IP joints  -Joint Mobilization LUE digits and wrist  -Shoulder Flexion, Shoulder Abduction, ER, IR 1 set of 5 reps each  -Elbow Flexion/Extension, Forearm Pronation/Supination, Wrist Flexion/Extension 1 set of 5 reps each  -Yellow  Theraputty Strengthening Exercises for full , opposition pinch, small ball formation -STM to L shoulder deltoid, biceps, triceps  -STM to forearm wrist flexors/extensors  -PROM stretching digits MCPS/PIPs/DIPs and thumbs MP/IP joints  -Joint Mobilization LUE digits and wrist  -Shoulder Flexion, Shoulder Abduction, ER, IR 1 set of 5 reps each  -Elbow Flexion/Extension, Forearm Pronation/Supination, Wrist Flexion/Extension 1 set of 5 reps each  -Yellow Theraputty Strengthening Exercises for full , opposition pinch, small ball formation -STM to L shoulder deltoid, biceps, triceps  -STM to forearm wrist flexors/extensors  -PROM stretching digits MCPS/PIPs/DIPs and thumbs MP/IP joints  -Joint Mobilization LUE digits and wrist  -Shoulder Flexion, Shoulder Abduction, ER, IR 1 set of 5 reps each  -Elbow Flexion/Extension, Forearm Pronation/Supination, Wrist Flexion/Extension 1 set of 5 reps each  -Yellow Theraputty Strengthening Exercises for full , opposition pinch, small ball formation  -[address] [place] Sorting activity LUE -STM to L shoulder deltoid, biceps, triceps  -STM to forearm wrist flexors/extensors  -PROM stretching digits MCPS/PIPs/DIPs and thumbs MP/IP joints  -Joint Mobilization LUE digits and wrist  -Review of HEP and POC     HEP:   -AROM/AAROM Stretching Routine for Shoulders, Elbows, Wrists  -Tendon Gliding Exercises  -Thumb ROM Exercises  -Yellow Theraputty Strengthening Exercises    Charges: MTx2        Total Timed Treatment: 30 min  Total Treatment Time: 30 min

## 2024-12-04 ENCOUNTER — OFFICE VISIT (OUTPATIENT)
Dept: FAMILY MEDICINE CLINIC | Facility: CLINIC | Age: 39
End: 2024-12-04
Payer: MEDICARE

## 2024-12-04 VITALS
SYSTOLIC BLOOD PRESSURE: 112 MMHG | HEART RATE: 92 BPM | OXYGEN SATURATION: 97 % | RESPIRATION RATE: 16 BRPM | DIASTOLIC BLOOD PRESSURE: 68 MMHG

## 2024-12-04 DIAGNOSIS — R05.9 COUGH, UNSPECIFIED TYPE: Primary | ICD-10-CM

## 2024-12-04 PROCEDURE — 99214 OFFICE O/P EST MOD 30 MIN: CPT | Performed by: PHYSICIAN ASSISTANT

## 2024-12-04 RX ORDER — BENZONATATE 200 MG/1
200 CAPSULE ORAL 3 TIMES DAILY PRN
Qty: 30 CAPSULE | Refills: 0 | Status: SHIPPED | OUTPATIENT
Start: 2024-12-04

## 2024-12-04 RX ORDER — ALBUTEROL SULFATE 90 UG/1
2 INHALANT RESPIRATORY (INHALATION) EVERY 6 HOURS PRN
Qty: 1 EACH | Refills: 0 | Status: SHIPPED | OUTPATIENT
Start: 2024-12-04

## 2024-12-04 NOTE — PROGRESS NOTES
Subjective:   Patient ID: Jaye Jones is a 39 year old female.    Cough  Pertinent negatives include no chest pain, chills, fever, headaches, postnasal drip, rhinorrhea or sore throat. There is no history of environmental allergies.     Patient presents with cough that started before Thanksgiving   Symptoms are improving   Feels like something is something is stuck in her throat  Not painful, feels more like a tickle   Denies drainage  Cough is dry and nonproductive   Denies fever   Denies pain with swallowing   Feels similar to when she had thrush. She tried Nystatin which didn't really help symptoms   Her father believes it is leaf mold allergy   She was previously prescribed Benzonatate which relieved symptoms last year   She takes loratadine 10 mg every day for allergies     History/Other:   Review of Systems   Constitutional:  Negative for chills, fatigue and fever.   HENT:  Negative for congestion, postnasal drip, rhinorrhea, sore throat and trouble swallowing.    Eyes:  Negative for photophobia and itching.   Respiratory:  Positive for cough. Negative for chest tightness.    Cardiovascular:  Negative for chest pain and palpitations.   Gastrointestinal:  Negative for nausea and vomiting.   Allergic/Immunologic: Negative for environmental allergies and food allergies.   Neurological:  Negative for headaches.     Current Outpatient Medications   Medication Sig Dispense Refill    baclofen 10 MG Oral Tab Take 1 tablet (10 mg total) by mouth 3 (three) times daily.      potassium chloride 20 MEQ Oral Powd Pack Take 20 mEq by mouth 2 (two) times daily. Dad states a spoon full      loratadine 10 MG Oral Tab Take 1 tablet (10 mg total) by mouth daily.       Allergies:Allergies[1]    Objective:   Physical Exam  Vitals and nursing note reviewed.   Constitutional:       Appearance: Normal appearance.   HENT:      Head: Normocephalic and atraumatic.      Right Ear: Tympanic membrane and ear canal normal.      Left Ear:  Tympanic membrane and ear canal normal.      Nose: Congestion present.      Right Turbinates: Swollen.      Left Turbinates: Swollen.      Right Sinus: No maxillary sinus tenderness or frontal sinus tenderness.      Left Sinus: No maxillary sinus tenderness or frontal sinus tenderness.      Mouth/Throat:      Pharynx: Posterior oropharyngeal erythema and postnasal drip present.   Eyes:      Conjunctiva/sclera: Conjunctivae normal.   Cardiovascular:      Rate and Rhythm: Normal rate and regular rhythm.      Heart sounds: Normal heart sounds.   Pulmonary:      Effort: Pulmonary effort is normal.      Breath sounds: Normal breath sounds.   Lymphadenopathy:      Cervical: No cervical adenopathy.   Neurological:      Mental Status: She is alert.   Psychiatric:         Mood and Affect: Mood and affect normal.         Behavior: Behavior normal. Behavior is cooperative.         Assessment & Plan:   1. Cough, unspecified type  Here with cough x 2 weeks. On exam she has PND and congestion. Likely 2/2 allergies. Recommend switching antihistamine from loratadine to zyrtec or xyzal, start flonase, use tessalon prn, and given albuterol inhaler to use prn coughing fits. Follow up if symptoms worsen or do not improve in the coming days.   - benzonatate 200 MG Oral Cap; Take 1 capsule (200 mg total) by mouth 3 (three) times daily as needed.  Dispense: 30 capsule; Refill: 0  - albuterol 108 (90 Base) MCG/ACT Inhalation Aero Soln; Inhale 2 puffs into the lungs every 6 (six) hours as needed.  Dispense: 1 each; Refill: 0  - Spacer/Aero-Holding Chambers Does not apply Device; Use as directed.  Dispense: 1 each; Refill: 0            [1] No Known Allergies

## 2024-12-13 ENCOUNTER — OFFICE VISIT (OUTPATIENT)
Facility: CLINIC | Age: 39
End: 2024-12-13
Payer: MEDICARE

## 2024-12-13 VITALS
BODY MASS INDEX: 23.6 KG/M2 | HEIGHT: 61 IN | SYSTOLIC BLOOD PRESSURE: 128 MMHG | DIASTOLIC BLOOD PRESSURE: 84 MMHG | WEIGHT: 125 LBS

## 2024-12-13 DIAGNOSIS — R05.9 COUGH, UNSPECIFIED TYPE: ICD-10-CM

## 2024-12-13 DIAGNOSIS — N90.7 SEBACEOUS CYST OF LABIA: Primary | ICD-10-CM

## 2024-12-13 PROCEDURE — 99203 OFFICE O/P NEW LOW 30 MIN: CPT

## 2024-12-13 RX ORDER — BENZONATATE 200 MG/1
200 CAPSULE ORAL 3 TIMES DAILY PRN
Qty: 30 CAPSULE | Refills: 0 | OUTPATIENT
Start: 2024-12-13

## 2024-12-13 RX ORDER — CLINDAMYCIN PHOSPHATE 10 UG/ML
1 LOTION TOPICAL DAILY
Qty: 60 ML | Refills: 0 | Status: SHIPPED | OUTPATIENT
Start: 2024-12-13

## 2024-12-13 NOTE — PROGRESS NOTES
Gynecology Office Visit      Jaye Jones is a 39 year old female  Patient's last menstrual period was 2024 (approximate). (contraception:  abstinence)     HPI:     Chief Complaint   Patient presents with    Vaginal Problem     C/o tender painful sore on left side x4-5 days, clear discharge on one occasion       Jaye presents with her father for evaluation of a painful area on her left labia for the past 4-5 days. Patient has a history of cerebral palsy and is wheelchair bound - her father helps with perineal hygiene and noticed something that looks like \"acne\" in the area. She wears panty liners daily for occasional incontinence and her father noticed a small amount of clear discharge from the area on the panty liner x1. The patient reports the area is tender to the touch.     Chart and previous encounters reviewed.  HISTORY:  Past Medical History:    Hordeolum internum    Impacted cerumen    Syncope and collapse      Past Surgical History:   Procedure Laterality Date    Anesth,hip joint surgery  90    bilateral metal plates    Other  90    femoral osteoplasty lengthening    Other surgical history      lengthening of hamstring tendon multiple, both legs    Other surgical history  88    rhizotomy      Family History   Problem Relation Age of Onset    Diabetes Father     Skin cancer Mother     Other (SIDS [Other]) Sister       Social History:   Social History     Socioeconomic History    Marital status: Single   Tobacco Use    Smoking status: Never     Passive exposure: Never    Smokeless tobacco: Never   Vaping Use    Vaping status: Never Used   Substance and Sexual Activity    Alcohol use: No    Drug use: No   Other Topics Concern    Caffeine Concern No    Exercise Yes     Comment: OP        Medications (Active prior to today's visit):  Current Outpatient Medications   Medication Sig Dispense Refill    clindamycin 1 % External Lotion Apply 1 Application topically daily. Apply a  pea-sized amount to the affected area daily x1 week 60 mL 0    benzonatate 200 MG Oral Cap Take 1 capsule (200 mg total) by mouth 3 (three) times daily as needed. 30 capsule 0    albuterol 108 (90 Base) MCG/ACT Inhalation Aero Soln Inhale 2 puffs into the lungs every 6 (six) hours as needed. 1 each 0    Spacer/Aero-Holding Chambers Does not apply Device Use as directed. 1 each 0    baclofen 10 MG Oral Tab Take 1 tablet (10 mg total) by mouth 3 (three) times daily.      potassium chloride 20 MEQ Oral Powd Pack Take 20 mEq by mouth 2 (two) times daily. Dad states a spoon full      loratadine 10 MG Oral Tab Take 1 tablet (10 mg total) by mouth daily.         Allergies:  Allergies[1]    Gyn:  Menarche: 12  Period Cycle (Days): 28  Period Duration (Days): 4  Hx Prior Abnormal Pap: No  Pap Result Notes:  normal per pt  Follow Up Recommendation: normal per pt    OB Hx:  OB History    Para Term  AB Living   0 0 0 0 0 0   SAB IAB Ectopic Multiple Live Births   0 0 0 0 0         ROS:    10 point ROS completed and was negative, except for pertinent positive and negatives stated in the HPI.    PHYSICAL EXAM:   /84   Ht 61\"   Wt 125 lb (56.7 kg)   LMP 2024 (Approximate)   BMI 23.62 kg/m²      Wt Readings from Last 6 Encounters:   24 125 lb (56.7 kg)   05/10/24 120 lb (54.4 kg)   24 120 lb (54.4 kg)   01/10/24 120 lb (54.4 kg)   23 120 lb (54.4 kg)   18 146 lb (66.2 kg)        Gen:  Oriented, in no acute distress - wheelchair bound    Pelvic:      External Genitalia: +sebaceous cyst on the left labia majora - able to express a small amount of yellow discharge  Vagina: internal speculum exam deferred at this time  Cervix: deferred  Uterus: deferred  Adnexa: non tender, no masses, normal size  Rectal: deferred       ASSESSMENT/PLAN:     1. Sebaceous cyst of labia    Sebaceous cyst expressed  Rx for topical abx cream to prevent infection    Meds This Visit:  Requested  Prescriptions     Signed Prescriptions Disp Refills    clindamycin 1 % External Lotion 60 mL 0     Sig: Apply 1 Application topically daily. Apply a pea-sized amount to the affected area daily x1 week       Imaging & Referrals:  None     Return in about 3 months (around 3/13/2025) for Well Woman Exam.      Iza Astorga, APRN  12/13/2024  4:55 PM         This note was created by [employer] voice recognition. Errors in content may be related to improper recognition by the system; efforts to review and correct have been done but errors may still exist. Please contact me with any questions.    Note to patient and family   The 21st Century Cures Act makes medical notes available to patients in the interest of transparency.  However, please be advised that this is a medical document.  It is intended as [id number] communication.  It is written and medical language may contain abbreviations or verbiage that are technical and unfamiliar.  It may appear blunt or direct.  Medical documents are intended to carry relevant information, facts as evident, and the clinical opinion of the practitioner.           [1] No Known Allergies

## 2024-12-14 DIAGNOSIS — R05.9 COUGH, UNSPECIFIED TYPE: ICD-10-CM

## 2024-12-14 RX ORDER — BENZONATATE 200 MG/1
200 CAPSULE ORAL 3 TIMES DAILY PRN
Qty: 30 CAPSULE | Refills: 0 | Status: SHIPPED | OUTPATIENT
Start: 2024-12-14

## 2024-12-14 NOTE — TELEPHONE ENCOUNTER
A refill request was received for:  Requested Prescriptions     Pending Prescriptions Disp Refills    BENZONATATE 200 MG Oral Cap [Pharmacy Med Name: BENZONATATE 200MG CAPSULES] 30 capsule 0     Sig: TAKE 1 CAPSULE(200 MG) BY MOUTH THREE TIMES DAILY AS NEEDED       Last refill date:12/4/2024       Last office visit:12/4/2024 cough     Follow up due:  No future appointments.

## 2024-12-19 ENCOUNTER — MED REC SCAN ONLY (OUTPATIENT)
Facility: CLINIC | Age: 39
End: 2024-12-19

## 2025-05-15 ENCOUNTER — OFFICE VISIT (OUTPATIENT)
Facility: CLINIC | Age: 40
End: 2025-05-15
Payer: MEDICARE

## 2025-05-15 VITALS
SYSTOLIC BLOOD PRESSURE: 118 MMHG | DIASTOLIC BLOOD PRESSURE: 72 MMHG | HEIGHT: 61 IN | BODY MASS INDEX: 24.55 KG/M2 | WEIGHT: 130 LBS

## 2025-05-15 DIAGNOSIS — Z01.419 WELL WOMAN EXAM WITH ROUTINE GYNECOLOGICAL EXAM: Primary | ICD-10-CM

## 2025-05-15 DIAGNOSIS — N90.7 SEBACEOUS CYST OF LABIA: ICD-10-CM

## 2025-05-15 DIAGNOSIS — Z12.4 SCREENING FOR MALIGNANT NEOPLASM OF CERVIX: ICD-10-CM

## 2025-05-15 PROCEDURE — 87624 HPV HI-RISK TYP POOLED RSLT: CPT

## 2025-05-15 PROCEDURE — G0101 CA SCREEN;PELVIC/BREAST EXAM: HCPCS

## 2025-05-15 NOTE — PROGRESS NOTES
GYN H&P     Genetic questionnaire reviewed with the patient and she will be referred for genetic counseling if the questionnaire had any positive results.    The [facility] intake form was also reviewed regarding contraception, menstrual periods, urinary health, and vaginal / sexual health    5/15/2025  12:47 PM    Chief Complaint   Patient presents with    Physical     Pt here for annual exam cyst reoccurred in same location \"gone down but still present\"        HPI: Jaye is a 40 year old  Patient's last menstrual period was 2025 (approximate).  (contraception: abstinence/not sexually active) here for her annual gyn exam.     Jaye presents with her father for physical exam. Exam was completed in patient's wheelchair with appropriate accommodations. She has no complaints.  Menses are regular. Denies any pelvic or breast complaints.  Has never been sexually active. Reports she wears a panty liner daily due to occasional urinary incontinence.     Last seen 24 for labial sebaceous cyst. Pt reports she still thinks the cyst is there but it has gone down/gotten better over the past few days. Her father states he tried to squeeze the area and a small amount of pus came out. Has rx for topical clindamycin cream and has also been putting that on the area. Reports the cyst was painful when it first occurred but now it is improving.    Previous encounters and chart reviewed.     OB History    Para Term  AB Living   0 0 0 0 0 0   SAB IAB Ectopic Multiple Live Births   0 0 0 0 0       GYN hx:   Menarche: 12  Period Cycle (Days): 28  Period Duration (Days): 4  Hx Prior Abnormal Pap: No  Pap Result Notes:  per pt  Follow Up Recommendation: due today      Past Medical History[1]  Past Surgical History[2]  Allergies[3]  Medications Ordered Prior to Encounter[4]  Family History[5]  Social History     Socioeconomic History    Marital status: Single     Spouse name: Not on file     Number of children: Not on file    Years of education: Not on file    Highest education level: Not on file   Occupational History    Not on file   Tobacco Use    Smoking status: Never     Passive exposure: Never    Smokeless tobacco: Never   Vaping Use    Vaping status: Never Used   Substance and Sexual Activity    Alcohol use: No    Drug use: No    Sexual activity: Not on file   Other Topics Concern     Service Not Asked    Blood Transfusions Not Asked    Caffeine Concern No    Occupational Exposure Not Asked    Hobby Hazards Not Asked    Sleep Concern Not Asked    Stress Concern Not Asked    Weight Concern Not Asked    Special Diet Not Asked    Back Care Not Asked    Exercise Yes     Comment: OP    Bike Helmet Not Asked    Seat Belt Not Asked    Self-Exams Not Asked   Social History Narrative    Not on file     Social Drivers of Health     Food Insecurity: Not on file   Transportation Needs: Not on file   Stress: Not on file   Housing Stability: Not on file       ROS:     Review of Systems:  General: denies fevers, chills, fatigue and malaise.   Eyes: no visual changes, denies headaches  ENT: no complaints, denies earaches, runny nose, epistaxis, throat pain or sore throat  Respiratory: denies SOB, dyspnea, cough or wheezing  Cardiovascular: denies chest pain, palpitations  GI: denies abdominal pain, diarrhea, constipation  : no complaints, denies dysuria, increased urinary frequency, +occasional urinary incontinence due to cerebral palsy. Menses regular, no dysmenorrhea, no menorrhagia  Hematological/lymphatic: denies history of excessive bleeding or bruising, denies dizziness, lightheadedness.   Breast: denies rashes, skin changes, pain, lumps or discharge   Psychiatric: denies depression, changes in sleep patterns, anxiety  Endocrine: denies hot or cold intolerance, mood changes   Neurological: denies changes in sight, smell, hearing or taste.  Immunological: denies allergies, denies anaphylaxis, or  swollen lymph nodes        O /72   Ht 61\"   Wt 130 lb (59 kg)   LMP 05/08/2025 (Approximate)   BMI 24.56 kg/m²         Wt Readings from Last 6 Encounters:   05/15/25 130 lb (59 kg)   12/13/24 125 lb (56.7 kg)   05/10/24 120 lb (54.4 kg)   02/09/24 120 lb (54.4 kg)   01/10/24 120 lb (54.4 kg)   12/24/23 120 lb (54.4 kg)     Exam:   GENERAL: well developed, well nourished, in no apparent distress, oriented.  SKIN: no rashes, no suspicious lesions  HEENT: normal  NECK: supple; no thyromegaly, no adenopathy  LUNGS: clear to auscultation  CARDIOVASCULAR: normal S1, S2, RRR  BREASTS: soft, nontender, no palpable masses or nodes, no nipple discharge, no skin changes, no axillary adenopathy  ABDOMEN: no scars,  soft, non distended; non tender, no masses  PELVIC: External Genitalia: +two 0.5 cm sebaceous cysts on the left labia - no redness, or drainage    Vagina: normal pink mucosa, no lesions, normal clear discharge.    Bladder well supported.  No  anterior or posterior hernias    Cervix: nulliparous, no lesions , No CMT     Uterus: AVAF, mobile, non tender, normal size    Adnexa: non tender, no masses, normal size    Rectal: deferred  EXTREMITIES:  non tender without edema           Patient counseled on:    Diet/physical activity     Self Breast Exams          Pap: collected today  GC/Chlamydia:  n/a, pt has never been sexually active  Mammogram:  n/a, start at age 41   Dexa:  n/a  Colonoscopy / Cologuard:   n/a  Lipid / Cholesterol:  per PCP     A/P: Patient is 40 year old female with no complaints. Here for well woman exam.     Meds This Visit:    Requested Prescriptions      No prescriptions requested or ordered in this encounter       1. Well woman exam with routine gynecological exam    2. Screening for malignant neoplasm of cervix  - Hpv High Risk , Thin Prep Collection; Future  - ThinPrep PAP Smear; Future    3. Sebaceous cyst of labia    Pt reassured that as long as they as asymptomatic, sebaceous cysts  are not worrisome  Encouraged frequent changing of incontinence pads to prevent vulvar irritation    Return in about 1 year (around 5/15/2026) for Well Woman Exam.    Iza Astorga, KAITLIN   5/15/2025  12:47 PM       This note was created by [employer] voice recognition. Errors in content may be related to improper recognition by the system; efforts to review and correct have been done but errors may still exist. Please contact me with any questions.    Note to patient and family   The 21st Century Cures Act makes medical notes available to patients in the interest of transparency.  However, please be advised that this is a medical document.  It is intended as [id number] communication.  It is written in medical language which may contain abbreviations or verbiage that are technical and unfamiliar.  It may appear blunt or direct.  Medical documents are intended to carry relevant information, facts as evident, and the clinical opinion of the practitioner.           [1]   Past Medical History:   Hordeolum internum    Impacted cerumen    Syncope and collapse   [2]   Past Surgical History:  Procedure Laterality Date    Anesth,hip joint surgery  1/1/90    bilateral metal plates    Other  1/1/90    femoral osteoplasty lengthening    Other surgical history      lengthening of hamstring tendon multiple, both legs    Other surgical history  1/1/88    rhizotomy   [3] No Known Allergies  [4]   Current Outpatient Medications on File Prior to Visit   Medication Sig Dispense Refill    BENZONATATE 200 MG Oral Cap TAKE 1 CAPSULE(200 MG) BY MOUTH THREE TIMES DAILY AS NEEDED 30 capsule 0    clindamycin 1 % External Lotion Apply 1 Application topically daily. Apply a pea-sized amount to the affected area daily x1 week 60 mL 0    albuterol 108 (90 Base) MCG/ACT Inhalation Aero Soln Inhale 2 puffs into the lungs every 6 (six) hours as needed. 1 each 0    Spacer/Aero-Holding Chambers Does not apply Device Use as directed. 1 each 0     baclofen 10 MG Oral Tab Take 1 tablet (10 mg total) by mouth 3 (three) times daily.      potassium chloride 20 MEQ Oral Powd Pack Take 20 mEq by mouth 2 (two) times daily. Dad states a spoon full      loratadine 10 MG Oral Tab Take 1 tablet (10 mg total) by mouth daily.       No current facility-administered medications on file prior to visit.   [5]   Family History  Problem Relation Age of Onset    Diabetes Father     Skin cancer Mother     Other (SIDS [Other]) Sister

## 2025-05-16 ENCOUNTER — MED REC SCAN ONLY (OUTPATIENT)
Facility: CLINIC | Age: 40
End: 2025-05-16

## 2025-05-16 LAB — HPV E6+E7 MRNA CVX QL NAA+PROBE: NEGATIVE

## 2025-06-24 ENCOUNTER — OFFICE VISIT (OUTPATIENT)
Facility: CLINIC | Age: 40
End: 2025-06-24
Payer: MEDICARE

## 2025-06-24 VITALS — DIASTOLIC BLOOD PRESSURE: 86 MMHG | SYSTOLIC BLOOD PRESSURE: 136 MMHG

## 2025-06-24 DIAGNOSIS — R05.9 COUGH, UNSPECIFIED TYPE: ICD-10-CM

## 2025-06-24 DIAGNOSIS — N90.7 SEBACEOUS CYST OF LABIA: Primary | ICD-10-CM

## 2025-06-24 PROCEDURE — 99213 OFFICE O/P EST LOW 20 MIN: CPT

## 2025-06-24 RX ORDER — BENZONATATE 200 MG/1
200 CAPSULE ORAL 3 TIMES DAILY PRN
Qty: 30 CAPSULE | Refills: 0 | Status: SHIPPED | OUTPATIENT
Start: 2025-06-24

## 2025-06-24 RX ORDER — CEPHALEXIN 500 MG/1
500 CAPSULE ORAL 4 TIMES DAILY
Qty: 28 CAPSULE | Refills: 0 | Status: SHIPPED | OUTPATIENT
Start: 2025-06-24 | End: 2025-07-01

## 2025-06-24 NOTE — PROGRESS NOTES
Gynecology Office Visit    The patient was offered a medical chaperone during the visit.    Jaye Jones is a 40 year old female  Patient's last menstrual period was 2025 (approximate). (contraception:  n/a - abstinence)     HPI:     Chief Complaint   Patient presents with    Cyst     Labia on the right side. Pt states it is painful.       Jaye presents with her father for evaluation of a labial cyst she first noted yesterday. Her father helps with her perineal care and reports he felt the cyst when helping her to wipe. Reports the area is very painful/tender to the touch. States it is the size of a large pea and may have grown in size since she first notices it. Hx of recurrent sebaceous cysts on the labia - previous one on her right labia has resolved, this is a new area.      Chart and previous encounters reviewed.  HISTORY:  Past Medical History[1]   Past Surgical History[2]   Family History[3]   Social History: Short Social Hx on File[4]     Medications (Active prior to today's visit):  Current Medications[5]    Allergies:  Allergies[6]    Gyn:  Menarche: 12  Period Cycle (Days): 28  Period Duration (Days): 4  Hx Prior Abnormal Pap: No  Pap Date: 05/15/25  Pap Result Notes: neg, neg  Follow Up Recommendation:     OB Hx:  OB History    Para Term  AB Living   0 0 0 0 0 0   SAB IAB Ectopic Multiple Live Births   0 0 0 0 0         ROS:     10 point ROS completed and was negative, except for pertinent positive and negatives stated in the HPI.    PHYSICAL EXAM:   /86   LMP 2025 (Approximate)      Wt Readings from Last 6 Encounters:   05/15/25 130 lb (59 kg)   24 125 lb (56.7 kg)   05/10/24 120 lb (54.4 kg)   24 120 lb (54.4 kg)   01/10/24 120 lb (54.4 kg)   23 120 lb (54.4 kg)        Gen:  Oriented, in no acute distress    Pelvic:      External Genitalia: +tender, slightly erythematous, firm sebaceous cyst on the right labia, +comedone - unable to  express any discharge due to pain/tenderness      ASSESSMENT/PLAN:     1. Cough, unspecified type  - benzonatate 200 MG Oral Cap; Take 1 capsule (200 mg total) by mouth 3 (three) times daily as needed.  Dispense: 30 capsule; Refill: 0    2. Sebaceous cyst of labia  - Derm Referral - In Network    Rx for keflex sent   Discussed conservative measurements such as heat application to help with drainage  Pt and father provided with information for a dermatologist for recurrent sebaceous cysts     Meds This Visit:  Requested Prescriptions     Signed Prescriptions Disp Refills    benzonatate 200 MG Oral Cap 30 capsule 0     Sig: Take 1 capsule (200 mg total) by mouth 3 (three) times daily as needed.    cephALEXin 500 MG Oral Cap 28 capsule 0     Sig: Take 1 capsule (500 mg total) by mouth 4 (four) times daily for 7 days.       Imaging & Referrals:  DERM - INTERNAL     Return if symptoms worsen or fail to improve.      Iza Gauri, APRN  6/24/2025  4:36 PM         This note was created by [employer] voice recognition. Errors in content may be related to improper recognition by the system; efforts to review and correct have been done but errors may still exist. Please contact me with any questions.    Note to patient and family   The 21st Century Cures Act makes medical notes available to patients in the interest of transparency.  However, please be advised that this is a medical document.  It is intended as [id number] communication.  It is written and medical language may contain abbreviations or verbiage that are technical and unfamiliar.  It may appear blunt or direct.  Medical documents are intended to carry relevant information, facts as evident, and the clinical opinion of the practitioner.           [1]   Past Medical History:   Hordeolum internum    Impacted cerumen    Syncope and collapse   [2]   Past Surgical History:  Procedure Laterality Date    Anesth,hip joint surgery  1/1/90    bilateral metal plates     Other  1/1/90    femoral osteoplasty lengthening    Other surgical history      lengthening of hamstring tendon multiple, both legs    Other surgical history  1/1/88    rhizotomy   [3]   Family History  Problem Relation Age of Onset    Diabetes Father     Skin cancer Mother     Other (SIDS [Other]) Sister    [4]   Social History  Socioeconomic History    Marital status: Single   Tobacco Use    Smoking status: Never     Passive exposure: Never    Smokeless tobacco: Never   Vaping Use    Vaping status: Never Used   Substance and Sexual Activity    Alcohol use: No    Drug use: No   Other Topics Concern    Caffeine Concern No    Exercise Yes     Comment: OP     Social Drivers of Health     Food Insecurity: No Food Insecurity (6/24/2025)    NCSS - Food Insecurity     Worried About Running Out of Food in the Last Year: No     Ran Out of Food in the Last Year: No   Transportation Needs: No Transportation Needs (6/24/2025)    NCSS - Transportation     Lack of Transportation: No   Housing Stability: Not At Risk (6/24/2025)    NCSS - Housing/Utilities     Has Housing: Yes     Worried About Losing Housing: No     Unable to Get Utilities: No   [5]   Current Outpatient Medications   Medication Sig Dispense Refill    benzonatate 200 MG Oral Cap Take 1 capsule (200 mg total) by mouth 3 (three) times daily as needed. 30 capsule 0    cephALEXin 500 MG Oral Cap Take 1 capsule (500 mg total) by mouth 4 (four) times daily for 7 days. 28 capsule 0    clindamycin 1 % External Lotion Apply 1 Application topically daily. Apply a pea-sized amount to the affected area daily x1 week 60 mL 0    albuterol 108 (90 Base) MCG/ACT Inhalation Aero Soln Inhale 2 puffs into the lungs every 6 (six) hours as needed. 1 each 0    baclofen 10 MG Oral Tab Take 1 tablet (10 mg total) by mouth 3 (three) times daily.      potassium chloride 20 MEQ Oral Powd Pack Take 20 mEq by mouth 2 (two) times daily. Dad states a spoon full      loratadine 10 MG Oral Tab  Take 1 tablet (10 mg total) by mouth daily.     [6] No Known Allergies

## (undated) NOTE — LETTER
07/28/21        Jaye Jones  [address] [facility]      Dear Madeleine Suazo,    Our records indicate that you have outstanding lab work and or testing that was ordered for you and has not yet been completed:  Orders Placed This Encounter      C

## (undated) NOTE — LETTER
Patient Name: Jaye Jones  YOB: 1985          MRN number:  [id number]  Date:  4/12/2024  Referring Physician:  Bhaskar Lacy         OCCUPATIONAL THERAPY UPPER EXTREMITY EVALUATION     Diagnosis:   Cerebral palsy (HCC) (G80.9)       Referring Provider: Bhaskar Lacy  Date of Evaluation:    4/12/2024    Precautions:   Cerebral Palsy Next MD visit:   none scheduled  Date of Surgery: n/a     PATIENT SUMMARY   Jaye Jones is a 39 year old female who presents to therapy today with complaints of LUE muscle spasms that have worsened over the past few months. Patient notes baseline of having muscle spasms due to diagnosis of cerebral palsy but that frequency and symptoms have worsened since having bronchitis (severe coughing episodes) last November 2023 resulting in TMJ stiffness/muscle spasms and increased tightness in her LUE. Patient recently saw PT to address TMJ muscle spasms. Patient notes that LUE muscle spasms are present at baseline and that she is currently taking baclofen with symptoms slowly improving over the past few months but still present. OT services received physician's orders for evaluation and treatment as indicated to maximize rehab potential.  Pt describes pain level current 0/10, at best 0/10, at worst 0/10. Patient denies any pain or numbness/tingling.  Current functional limitations include typing on computer, feeding, gripping, lifting, and overall function in LUE.    Jaye describes prior level of function as functionally (I) w/ typing and feeding. Patient does require assistance at baseline for ADL/IADL management due to CP.   Employment:  n/a  Hand Dominance: left  Living Situation: Family  Imaging/Tests: n/a  Pt goals include assist in managing muscle spasms of LUE.  Past medical history was reviewed with Jaye. Significant findings include cerebral palsy and muscle spasms.    ASSESSMENT  Jaye presents to occupational therapy evaluation with primary c/o LUE muscle  spasms that have worsened over the past few months. Patient notes baseline of having muscle spasms due to diagnosis of cerebral palsy but that frequency and symptoms have worsened since having bronchitis (severe coughing episodes) last November 2023 resulting in TMJ stiffness/muscle spasms and increased tightness in her LUE. Patient recently saw PT to address TMJ muscle spasms. Patient notes that LUE muscle spasms are present at baseline and that she is currently taking baclofen with symptoms slowly improving over the past few months but still present. OT services received physician's orders for evaluation and treatment as indicated to maximize rehab potential. The results of the objective tests and measures show ROM deficits and muscle spasms. Functional deficits include but are not limited to typing on computer, feeding, gripping, lifting, and overall function in LUE. Signs and symptoms are consistent with diagnosis of cerebral palsy. Pt and OT discussed evaluation findings, pathology, POC and HEP.  Pt voiced understanding and performs HEP correctly without reported pain. Skilled Occupational Therapy is medically necessary to address the above impairments and reach functional goals.     OBJECTIVE    OBSERVATION: Patient presents with frequent muscle spasms with patient noting triggers including hand movements, typing, sleeping, and sometimes when she is doing nothing. Patient notes slight improvement since increase in Baclofen dosage in Feb 2024. Spasms have patient extension arm in D2 positioning occurring multiple times a day and lasting a few seconds at a time.    ORTHOTICS: n/a    SENSORY: Patient denies any numbness/tingling.    EDEMA: n/a    AROM (degrees): Patient presents with flexed positioning of bilateral digits. Shoulder, elbow, and wrist movements WFL with compensatory strategies and tendency towards D2 extension positioning to extend digits.    MANUAL MUSCLE TESTING:   Shoulder Flexion: R=NT;  L=4/5  Shoulder Abduction: R=NT; L=4/5  Shoulder External Rotation: R=NT; L=4/5  Shoulder Internal Rotation: R=NT; L=4/5  Elbow Flexion: R=NT; L=4+/5  Elbow Extension: R=NT; L=4/5  Forearm Pronation: R=NT; L=4/5  Forearm Supination: R=NT; L=2+/5  Wrist Flexion: R=NT; L=4+/5  Wrist Extension: R=NT; L=4/5    Strength (lbs) Right Average Left Average   : 4.0 lbs 16.4 lbs   2 pt Pinch: NT /nt   3 pt Pinch: NT NT   Lateral Pinch: NT NT       Today’s Treatment and Response:   Pt education was provided on exam findings, treatment diagnosis, treatment plan, expectations, and prognosis. Pt was also provided recommendations for use of moist heat and HEP exercises.  Patient was instructed in and issued a HEP for:   -AROM/AAROM Stretching Routine for Shoulders, Elbows, Wrists  -Tendon Gliding Exercises  -Thumb ROM Exercises    Charges: OT Eval: High Complexity, TEx1      Total Timed Treatment: 45 min     Total Treatment Time: 45 min     Based on clinical rationale and outcome measures, this evaluation involved High Complexity decision making due to 3+ personal factors/comorbidities, 3 body structures involved/activity limitations, and evolving symptoms including  muscle spasm symptoms .  PLAN OF CARE   Goals: (to be met in 12 visits)   1. Patient will be independent with daily LUE stretching routine indicating increased ability to manage functional ADL/IADLs  2. Patient will report infrequent incidence of LUE muscle spasms daily <15 indicating increased management of symptoms.  3. Patient will demonstrate at least 20.0 lbs or greater L  strength indicating increased ability to manage functional daily tasks.  4. Patient will be independent and compliant with comprehensive HEP to maintain progress achieved in OT.    Frequency / Duration: Patient will be seen for 2x/week or a total of 12 visits over a 90 day period.  Treatment will include: Manual Therapy, Neuromuscular Re-education, Self-Care Home Management, Therapeutic  Activities, Therapeutic Exercise, and Home Exercise Program instruction, Splinting, Modalities PRN    Education or treatment limitation: None  Rehab Potential:good    QuickDASH Outcome Score  Score: 56.82 % (4/5/2024 10:07 AM)      Patient/Family/Caregiver was advised of these findings, precautions, and treatment options and has agreed to actively participate in planning and for this course of care.    Thank you for your referral. Please co-sign or sign and return this letter via fax as soon as possible to [phone]. If you have any questions, please contact me at Dept: [phone]    Sincerely,  Electronically signed by therapist: Erick Mariano OT  Physician's certification required: Yes  I certify the need for these services furnished under this plan of treatment and while under my care.    X___________________________________________________ Date____________________    Certification From: 4/12/2024  To:7/11/2024 21st Century Cures Act Notice to Patient: Medical documents like this are made available to patients in the interest of transparency. However, be advised this is a medical document and it is intended as [id number] communication between your medical providers. This medical document may contain abbreviations, assessments, medical data, and results or other terms that are unfamiliar. Medical documents are intended to carry relevant information, facts as evident, and the clinical opinion of the practitioner. As such, this medical document may be written in language that appears blunt or direct. You are encouraged to contact your medical provider and/or [facility] Experience if you have any questions about this medical document.